# Patient Record
Sex: FEMALE | Race: BLACK OR AFRICAN AMERICAN | NOT HISPANIC OR LATINO | Employment: OTHER | URBAN - METROPOLITAN AREA
[De-identification: names, ages, dates, MRNs, and addresses within clinical notes are randomized per-mention and may not be internally consistent; named-entity substitution may affect disease eponyms.]

---

## 2017-03-15 ENCOUNTER — HISTORICAL (OUTPATIENT)
Dept: CARDIOLOGY | Facility: HOSPITAL | Age: 52
End: 2017-03-15

## 2017-03-20 ENCOUNTER — HISTORICAL (OUTPATIENT)
Dept: LAB | Facility: HOSPITAL | Age: 52
End: 2017-03-20

## 2017-06-12 ENCOUNTER — HISTORICAL (OUTPATIENT)
Dept: CARDIOLOGY | Facility: HOSPITAL | Age: 52
End: 2017-06-12

## 2017-06-12 LAB
ABS NEUT (OLG): 4.12 X10(3)/MCL (ref 2.1–9.2)
APTT PPP: 28.5 SECOND(S) (ref 20.6–36)
BASOPHILS # BLD AUTO: 0 X10(3)/MCL (ref 0–0.2)
BASOPHILS NFR BLD AUTO: 1 %
BUN SERPL-MCNC: 19 MG/DL (ref 7–18)
CALCIUM SERPL-MCNC: 9.3 MG/DL (ref 8.5–10.1)
CHLORIDE SERPL-SCNC: 107 MMOL/L (ref 98–107)
CO2 SERPL-SCNC: 31 MMOL/L (ref 21–32)
CREAT SERPL-MCNC: 0.82 MG/DL (ref 0.55–1.02)
EOSINOPHIL # BLD AUTO: 0 X10(3)/MCL (ref 0–0.9)
EOSINOPHIL NFR BLD AUTO: 1 %
ERYTHROCYTE [DISTWIDTH] IN BLOOD BY AUTOMATED COUNT: 14.6 % (ref 11.5–17)
GLUCOSE SERPL-MCNC: 95 MG/DL (ref 74–106)
HCT VFR BLD AUTO: 38.6 % (ref 37–47)
HGB BLD-MCNC: 12.3 GM/DL (ref 12–16)
INR PPP: 0.98 (ref 0–1.27)
LYMPHOCYTES # BLD AUTO: 1.6 X10(3)/MCL (ref 0.6–4.6)
LYMPHOCYTES NFR BLD AUTO: 25 %
MCH RBC QN AUTO: 28.2 PG (ref 27–31)
MCHC RBC AUTO-ENTMCNC: 31.9 GM/DL (ref 33–36)
MCV RBC AUTO: 88.5 FL (ref 80–94)
MONOCYTES # BLD AUTO: 0.5 X10(3)/MCL (ref 0.1–1.3)
MONOCYTES NFR BLD AUTO: 8 %
NEUTROPHILS # BLD AUTO: 4.12 X10(3)/MCL (ref 1.4–7.9)
NEUTROPHILS NFR BLD AUTO: 65 %
PLATELET # BLD AUTO: 320 X10(3)/MCL (ref 130–400)
PMV BLD AUTO: 11 FL (ref 9.4–12.4)
POTASSIUM SERPL-SCNC: 3.3 MMOL/L (ref 3.5–5.1)
PROTHROMBIN TIME: 12.8 SECOND(S) (ref 12.1–14.2)
RBC # BLD AUTO: 4.36 X10(6)/MCL (ref 4.2–5.4)
SODIUM SERPL-SCNC: 146 MMOL/L (ref 136–145)
WBC # SPEC AUTO: 6.3 X10(3)/MCL (ref 4.5–11.5)

## 2018-10-09 ENCOUNTER — HISTORICAL (OUTPATIENT)
Dept: CARDIOLOGY | Facility: HOSPITAL | Age: 53
End: 2018-10-09

## 2018-10-09 LAB
BUN SERPL-MCNC: 40 MG/DL (ref 7–18)
CALCIUM SERPL-MCNC: 10.1 MG/DL (ref 8.5–10.1)
CHLORIDE SERPL-SCNC: 103 MMOL/L (ref 98–107)
CO2 SERPL-SCNC: 28 MMOL/L (ref 21–32)
CREAT SERPL-MCNC: 1.4 MG/DL (ref 0.55–1.02)
CREAT/UREA NIT SERPL: 28.6
GLUCOSE SERPL-MCNC: 98 MG/DL (ref 74–106)
POTASSIUM SERPL-SCNC: 4 MMOL/L (ref 3.5–5.1)
SODIUM SERPL-SCNC: 140 MMOL/L (ref 136–145)

## 2019-08-23 PROBLEM — M16.12 PRIMARY OSTEOARTHRITIS OF LEFT HIP: Status: ACTIVE | Noted: 2019-08-23

## 2019-11-27 PROBLEM — Z01.818 ENCOUNTER FOR OTHER PREPROCEDURAL EXAMINATION: Status: ACTIVE | Noted: 2019-11-01

## 2019-11-27 PROBLEM — G89.29 CHRONIC PAIN OF BOTH KNEES: Status: ACTIVE | Noted: 2017-02-06

## 2019-11-27 PROBLEM — M25.552 LEFT HIP PAIN: Status: ACTIVE | Noted: 2018-05-04

## 2019-11-27 PROBLEM — M25.561 CHRONIC PAIN OF BOTH KNEES: Status: ACTIVE | Noted: 2017-02-06

## 2019-11-27 PROBLEM — M25.562 CHRONIC PAIN OF BOTH KNEES: Status: ACTIVE | Noted: 2017-02-06

## 2020-05-05 ENCOUNTER — OFFICE VISIT (OUTPATIENT)
Dept: SURGERY | Facility: CLINIC | Age: 55
End: 2020-05-05
Payer: MEDICAID

## 2020-05-05 VITALS
WEIGHT: 210 LBS | OXYGEN SATURATION: 98 % | SYSTOLIC BLOOD PRESSURE: 147 MMHG | HEART RATE: 64 BPM | RESPIRATION RATE: 18 BRPM | BODY MASS INDEX: 31.93 KG/M2 | DIASTOLIC BLOOD PRESSURE: 88 MMHG

## 2020-05-05 DIAGNOSIS — K92.1 HEMATOCHEZIA: Primary | ICD-10-CM

## 2020-05-05 PROCEDURE — 99203 PR OFFICE/OUTPT VISIT, NEW, LEVL III, 30-44 MIN: ICD-10-PCS | Mod: S$GLB,,, | Performed by: SURGERY

## 2020-05-05 PROCEDURE — 99203 OFFICE O/P NEW LOW 30 MIN: CPT | Mod: S$GLB,,, | Performed by: SURGERY

## 2020-05-05 NOTE — LETTER
May 5, 2020      [facility]  [address]  [address]  [place] [address]           [facility]  [address] [address]  Phone: [phone]  Fax: [phone]          Patient: Marci Craft   MR Number: [id number]   YOB: 1965   Date of Visit: 5/5/2020       Dear [facility]:    Thank you for referring Marci Craft to me for evaluation. Attached you will find relevant portions of my assessment and plan of care.    If you have questions, please do not hesitate to call me. I look forward to following Marci Craft along with you.    Sincerely,    Basilio Valle, DO    Enclosure  CC:  No Recipients    If you would like to receive this communication electronically, please contact [email or web address] or [phone] to request more information on [employer] Link access.    For providers and/or their staff who would like to refer a patient to [facility], please contact us through our one-stop-shop provider referral line, [facility], at [phone].    If you feel you have received this communication in error or would no longer like to receive these types of communications, please e-mail [email or web address]

## 2020-05-05 NOTE — PROGRESS NOTES
Subjective:       Patient ID: Marci Craft is a 54 y.o. female.    Chief Complaint: No chief complaint on file.    51-year-old female complaining of chronic diarrhea, and bright red blood per rectum.  Patient states that she has had GI bleed in the past she needed to be transfused.  This started over the last several weeks and is having blood per rectum.  Not complaining of any abdominal pain or any pain in her perianal area.  Patient had a colonoscopy several years ago but the findings are unclear.      Review of Systems   Constitutional: Negative for chills and fever.   HENT: Negative for congestion and rhinorrhea.    Eyes: Negative for discharge and visual disturbance.   Respiratory: Negative for shortness of breath and wheezing.    Cardiovascular: Negative for chest pain and palpitations.   Gastrointestinal: Negative.  Negative for abdominal distention, abdominal pain, anal bleeding, blood in stool, constipation, diarrhea, nausea, rectal pain and vomiting.   Endocrine: Negative for cold intolerance and heat intolerance.   Genitourinary: Negative for difficulty urinating and frequency.   Musculoskeletal: Negative for back pain and myalgias.   Skin: Negative for pallor and rash.   Neurological: Negative for dizziness and headaches.   Hematological: Negative for adenopathy. Does not bruise/bleed easily.   Psychiatric/Behavioral: Negative for behavioral problems. The patient is not nervous/anxious.        Objective:      Physical Exam   Constitutional: She is oriented to person, place, and time. Vital signs are normal. She appears well-developed and well-nourished. She is cooperative.   HENT:   Head: Normocephalic and atraumatic.   Eyes: Conjunctivae, EOM and lids are normal.   Neck: Trachea normal and normal range of motion.   Cardiovascular: Normal rate, regular rhythm and normal heart sounds.   Pulmonary/Chest: Effort normal and breath sounds normal.   Abdominal: Soft. Normal appearance and bowel sounds are  normal. She exhibits no distension. There is no tenderness. No hernia.   Neurological: She is alert and oriented to person, place, and time. She has normal strength.   Skin: Skin is warm, dry and intact.   Psychiatric: She has a normal mood and affect. Her speech is normal and behavior is normal.       Assessment:       No diagnosis found.    Plan:       54-year-old female with hematochezia, chronic diarrhea.  Will schedule patient for diagnostic colonoscopy, risks benefits discussed patient detail.

## 2020-05-29 PROBLEM — G89.29 CHRONIC RIGHT HIP PAIN: Status: ACTIVE | Noted: 2020-05-29

## 2020-05-29 PROBLEM — M25.551 CHRONIC RIGHT HIP PAIN: Status: ACTIVE | Noted: 2020-05-29

## 2020-05-29 PROBLEM — M16.11 OSTEOARTHRITIS OF RIGHT HIP: Status: ACTIVE | Noted: 2020-05-29

## 2020-08-03 LAB
CALCIUM BLD-MCNC: NEGATIVE MG/DL
COV PREGNANCY STATUS: NORMAL
COVID-19 AB, IGM: NEGATIVE
PATIENT ADMITTED TO ICU FOR CONDITION: NO
PATIENT EMPLOYED IN HEALTHCARE: NO
PATIENT HAS SYMPTOMS FOR CONDITION OF INTEREST: NO
PATIENT HOSPITALIZED BC COND: NO
PATIENT IN CONGREGATE CARE: NO

## 2020-08-10 ENCOUNTER — OUTSIDE PLACE OF SERVICE (OUTPATIENT)
Dept: SURGERY | Facility: CLINIC | Age: 55
End: 2020-08-10
Payer: MEDICAID

## 2020-08-10 PROCEDURE — 45378 DIAGNOSTIC COLONOSCOPY: CPT | Mod: ,,, | Performed by: SURGERY

## 2020-08-10 PROCEDURE — 45378 PR COLONOSCOPY,DIAGNOSTIC: ICD-10-PCS | Mod: ,,, | Performed by: SURGERY

## 2020-08-19 ENCOUNTER — TELEPHONE (OUTPATIENT)
Dept: SURGERY | Facility: CLINIC | Age: 55
End: 2020-08-19

## 2020-09-14 PROBLEM — Z01.818 PRE-OPERATIVE EXAMINATION: Status: ACTIVE | Noted: 2020-09-14

## 2020-09-17 PROBLEM — M16.11 OSTEOARTHRITIS OF RIGHT HIP: Status: RESOLVED | Noted: 2020-05-29 | Resolved: 2020-09-17

## 2020-09-17 PROBLEM — M16.12 PRIMARY OSTEOARTHRITIS OF LEFT HIP: Status: RESOLVED | Noted: 2019-08-23 | Resolved: 2020-09-17

## 2020-09-17 PROBLEM — M16.11 PRIMARY OSTEOARTHRITIS OF RIGHT HIP: Status: ACTIVE | Noted: 2020-09-17

## 2020-09-17 PROBLEM — Z96.641 STATUS POST TOTAL REPLACEMENT OF RIGHT HIP: Status: ACTIVE | Noted: 2020-09-17

## 2020-09-17 PROBLEM — Z96.642 STATUS POST TOTAL HIP REPLACEMENT, LEFT: Status: ACTIVE | Noted: 2020-09-17

## 2020-09-17 PROBLEM — M25.552 LEFT HIP PAIN: Status: RESOLVED | Noted: 2018-05-04 | Resolved: 2020-09-17

## 2020-09-17 PROBLEM — M16.11 PRIMARY OSTEOARTHRITIS OF RIGHT HIP: Status: RESOLVED | Noted: 2020-09-17 | Resolved: 2020-09-17

## 2020-09-17 PROBLEM — G89.29 CHRONIC RIGHT HIP PAIN: Status: RESOLVED | Noted: 2020-05-29 | Resolved: 2020-09-17

## 2020-09-17 PROBLEM — M25.551 CHRONIC RIGHT HIP PAIN: Status: RESOLVED | Noted: 2020-05-29 | Resolved: 2020-09-17

## 2020-09-18 PROBLEM — M16.11 PRIMARY OSTEOARTHRITIS OF RIGHT HIP: Status: ACTIVE | Noted: 2020-09-18

## 2020-10-26 PROBLEM — G89.18 ACUTE POSTOPERATIVE PAIN OF RIGHT HIP: Status: ACTIVE | Noted: 2020-10-26

## 2020-10-26 PROBLEM — T81.49XA SUPERFICIAL POSTOPERATIVE WOUND INFECTION: Status: ACTIVE | Noted: 2020-10-26

## 2020-10-26 PROBLEM — L03.115 CELLULITIS OF RIGHT HIP: Status: ACTIVE | Noted: 2020-10-26

## 2020-10-26 PROBLEM — M25.551 ACUTE POSTOPERATIVE PAIN OF RIGHT HIP: Status: ACTIVE | Noted: 2020-10-26

## 2020-10-26 PROBLEM — Z96.641 HISTORY OF TOTAL RIGHT HIP REPLACEMENT: Status: ACTIVE | Noted: 2020-10-26

## 2020-10-27 PROBLEM — T81.42XA DEEP POSTOPERATIVE WOUND INFECTION: Status: ACTIVE | Noted: 2020-10-26

## 2020-10-27 PROBLEM — L03.115 CELLULITIS OF RIGHT HIP: Status: RESOLVED | Noted: 2020-10-26 | Resolved: 2020-10-27

## 2020-10-27 PROBLEM — Z01.818 PRE-OPERATIVE EXAMINATION: Status: RESOLVED | Noted: 2020-09-14 | Resolved: 2020-10-27

## 2020-10-27 PROBLEM — M25.551 ACUTE POSTOPERATIVE PAIN OF RIGHT HIP: Status: RESOLVED | Noted: 2020-10-26 | Resolved: 2020-10-27

## 2020-10-27 PROBLEM — G89.18 ACUTE POSTOPERATIVE PAIN OF RIGHT HIP: Status: RESOLVED | Noted: 2020-10-26 | Resolved: 2020-10-27

## 2021-05-12 ENCOUNTER — PATIENT MESSAGE (OUTPATIENT)
Dept: RESEARCH | Facility: HOSPITAL | Age: 56
End: 2021-05-12

## 2022-04-11 ENCOUNTER — HISTORICAL (OUTPATIENT)
Dept: ADMINISTRATIVE | Facility: HOSPITAL | Age: 57
End: 2022-04-11

## 2022-04-27 VITALS — BODY MASS INDEX: 25.94 KG/M2 | WEIGHT: 161.38 LBS | HEIGHT: 66 IN

## 2023-06-21 DIAGNOSIS — D3A.8 PRIMARY PANCREATIC NEUROENDOCRINE TUMOR: Primary | ICD-10-CM

## 2023-06-28 ENCOUNTER — OFFICE VISIT (OUTPATIENT)
Dept: SURGICAL ONCOLOGY | Facility: CLINIC | Age: 58
End: 2023-06-28
Payer: MEDICARE

## 2023-06-28 VITALS
DIASTOLIC BLOOD PRESSURE: 78 MMHG | SYSTOLIC BLOOD PRESSURE: 123 MMHG | BODY MASS INDEX: 31.67 KG/M2 | WEIGHT: 209 LBS | HEART RATE: 76 BPM | HEIGHT: 68 IN

## 2023-06-28 DIAGNOSIS — D3A.8 PRIMARY PANCREATIC NEUROENDOCRINE TUMOR: ICD-10-CM

## 2023-06-28 PROCEDURE — 3074F PR MOST RECENT SYSTOLIC BLOOD PRESSURE < 130 MM HG: ICD-10-PCS | Mod: CPTII,S$GLB,, | Performed by: SURGERY

## 2023-06-28 PROCEDURE — 1159F MED LIST DOCD IN RCRD: CPT | Mod: CPTII,S$GLB,, | Performed by: SURGERY

## 2023-06-28 PROCEDURE — 99999 PR PBB SHADOW E&M-EST. PATIENT-LVL V: CPT | Mod: PBBFAC,,, | Performed by: SURGERY

## 2023-06-28 PROCEDURE — 1159F PR MEDICATION LIST DOCUMENTED IN MEDICAL RECORD: ICD-10-PCS | Mod: CPTII,S$GLB,, | Performed by: SURGERY

## 2023-06-28 PROCEDURE — 3008F PR BODY MASS INDEX (BMI) DOCUMENTED: ICD-10-PCS | Mod: CPTII,S$GLB,, | Performed by: SURGERY

## 2023-06-28 PROCEDURE — 3008F BODY MASS INDEX DOCD: CPT | Mod: CPTII,S$GLB,, | Performed by: SURGERY

## 2023-06-28 PROCEDURE — 3078F DIAST BP <80 MM HG: CPT | Mod: CPTII,S$GLB,, | Performed by: SURGERY

## 2023-06-28 PROCEDURE — 3074F SYST BP LT 130 MM HG: CPT | Mod: CPTII,S$GLB,, | Performed by: SURGERY

## 2023-06-28 PROCEDURE — 99205 PR OFFICE/OUTPT VISIT, NEW, LEVL V, 60-74 MIN: ICD-10-PCS | Mod: S$GLB,,, | Performed by: SURGERY

## 2023-06-28 PROCEDURE — 99999 PR PBB SHADOW E&M-EST. PATIENT-LVL V: ICD-10-PCS | Mod: PBBFAC,,, | Performed by: SURGERY

## 2023-06-28 PROCEDURE — 99205 OFFICE O/P NEW HI 60 MIN: CPT | Mod: S$GLB,,, | Performed by: SURGERY

## 2023-06-28 PROCEDURE — 3078F PR MOST RECENT DIASTOLIC BLOOD PRESSURE < 80 MM HG: ICD-10-PCS | Mod: CPTII,S$GLB,, | Performed by: SURGERY

## 2023-06-28 RX ORDER — DEXTROMETHORPHAN HYDROBROMIDE, GUAIFENESIN 5; 100 MG/5ML; MG/5ML
650 LIQUID ORAL
COMMUNITY

## 2023-06-28 RX ORDER — PANTOPRAZOLE SODIUM 40 MG/1
40 TABLET, DELAYED RELEASE ORAL DAILY
COMMUNITY

## 2023-06-28 RX ORDER — PANCRELIPASE 24000; 76000; 120000 [USP'U]/1; [USP'U]/1; [USP'U]/1
2 CAPSULE, DELAYED RELEASE PELLETS ORAL
COMMUNITY

## 2023-06-28 RX ORDER — SERTRALINE HYDROCHLORIDE 100 MG/1
100 TABLET, FILM COATED ORAL 2 TIMES DAILY
COMMUNITY

## 2023-06-28 RX ORDER — CALCIUM CARB, CITRATE, MALATE 250 MG
1 CAPSULE ORAL DAILY
COMMUNITY

## 2023-06-28 RX ORDER — LOSARTAN POTASSIUM AND HYDROCHLOROTHIAZIDE 12.5; 1 MG/1; MG/1
1 TABLET ORAL DAILY
COMMUNITY

## 2023-06-28 NOTE — PROGRESS NOTES
History & Physical    CHIEF COMPLAINT:  PANCREATIC MASS     History of Present Illness:  57 year-old-female referred by Dr. Zachary Diaz.  In December 2022 patient presented to the ER with complaints of abdominal pain.  CT abd/pel showed a mass involving the distal body and tail of the pancreas with soft tissue stranding extending into the peripancreatic tissue which may represent superimposed pancreatitis.  EUS was performed, a parenchymal mass was noted in the pancreas; biopsy was not performed.  MRI abdomen in April 2023 showed a 6.7 x 3.8 x 3.8 cm heterogeneously enhancing mass within the pancreas, slightly decreased from previous imaging.  EUS performed in June 2023, parenchymal mass again noted in the pancreas with ductal dilation in the pancreas.  Biopsy of the mass revealed well-differentiated neuroendocrine tumor, grade 2 of 3.  The patient denies abdominal pain, nausea, vomiting, anorexia, weight loss, diarrhea or flushing.    Greater than 60 minutes was required for complete chart review, imaging review including interpretation of imaging, coordination with referring/other physicians, patient counseling regarding diagnosis/treatment plan, answering questions, medical decision making, and documentation.      Review of patient's allergies indicates:   Allergen Reactions    Nsaids (non-steroidal anti-inflammatory drug)        Current Outpatient Medications   Medication Sig Dispense Refill    amLODIPine (NORVASC) 5 MG tablet       aspirin (BUFFERIN) 325 MG Tab Take 1 tablet (325 mg total) by mouth 2 (two) times daily. 84 tablet 0    baclofen (LIORESAL) 10 MG tablet Take 10 mg by mouth 3 (three) times daily.      carvedilol (COREG) 12.5 MG tablet Take 12.5 mg by mouth.      cefTRIAXone (ROCEPHIN) 10 gram injection       clindamycin (CLEOCIN) 150 MG capsule       cyclobenzaprine (FLEXERIL) 10 MG tablet Take 10 mg by mouth 3 (three) times daily as needed for Muscle spasms.      diphenhydrAMINE (BENADRYL) 25 mg  capsule Take 25 mg by mouth every 6 (six) hours as needed for Itching.      ergocalciferol (ERGOCALCIFEROL) 50,000 unit Cap Take 50,000 Units by mouth.      escitalopram oxalate (LEXAPRO) 20 MG tablet Take 20 mg by mouth once daily.      furosemide (LASIX) 20 MG tablet       furosemide (LASIX) 40 MG tablet Take 40 mg by mouth 2 (two) times daily.      HYDROcodone-acetaminophen (NORCO) 5-325 mg per tablet SMARTSI Tablet(s) By Mouth Every 4 Hours      metoprolol tartrate (LOPRESSOR) 100 MG tablet Take 100 mg by mouth.      miscellaneous medical supply (C-TUB) Misc PT eval and treat DJD Knees      miscellaneous medical supply (C-TUB) Misc Dispense rolling walker, shower chair, bedside commode      miscellaneous medical supply (C-TUB) Misc PT eval and treat.  Post op L HUMBERTO protocol.   WBAT limit on post op limb.      omeprazole (PRILOSEC) 40 MG capsule       ondansetron (ZOFRAN-ODT) 4 MG TbDL       predniSONE (DELTASONE) 20 MG tablet       sacubitril-valsartan (ENTRESTO) 24-26 mg per tablet Take 1 tablet by mouth.      traMADoL (ULTRAM) 50 mg tablet       traZODone (DESYREL) 50 MG tablet Take 50 mg by mouth every evening.      vitamin D (VITAMIN D3) 1000 units Tab Take 1,000 Units by mouth once daily.       Current Facility-Administered Medications   Medication Dose Route Frequency Provider Last Rate Last Admin    sodium chloride 0.9% flush 10 mL  10 mL Intravenous PRN Jaime Augustine MD           Past Medical History:   Diagnosis Date    Anxiety     CHF (congestive heart failure)     Gout     Hypertension     Pacemaker 10/15/2018    Paroxysmal A-fib     Peptic ulcer disease      Past Surgical History:   Procedure Laterality Date    CARDIAC SURGERY      CARDIAC VALVE REPLACEMENT       SECTION      CHOLECYSTECTOMY      HIP ARTHROPLASTY Right 2020    Procedure: ARTHROPLASTY, HIP;  Surgeon: Minor Robledo MD;  Location: [facility] MAIN OR;  Service: Orthopedics;  Laterality: Right;    HIP SURGERY      INJECTION  OF JOINT Right 03/12/2020    Procedure: Injection, Joint, Shoulder, Hip, Or Knee;  Surgeon: Minor Robledo MD;  Location: [facility] MAIN OR;  Service: Orthopedics;  Laterality: Right;    IRRIGATION AND DEBRIDEMENT OF LOWER EXTREMITY Right 10/27/2020    Procedure: IRRIGATION AND DEBRIDEMENT RIGHT HIP;  Surgeon: Minor Robledo MD;  Location: [facility] MAIN OR;  Service: Orthopedics;  Laterality: Right;    MITRAL VALVE REPLACEMENT  03/21/2017    TUBAL LIGATION       Family History   Problem Relation Age of Onset    Depression Mother     Hyperlipidemia Maternal Aunt     Hypertension Maternal Aunt     Breast cancer Maternal Aunt      Social History     Tobacco Use    Smoking status: Never    Smokeless tobacco: Never   Substance Use Topics    Alcohol use: Never    Drug use: Never        Review of Systems:  Review of Systems   Constitutional:  Negative for appetite change, chills, diaphoresis and fever.   HENT:  Negative for congestion, drooling, ear discharge, ear pain and hearing loss.    Eyes:  Negative for discharge.   Respiratory:  Negative for apnea, cough, choking, chest tightness, shortness of breath and stridor.    Cardiovascular:  Negative for chest pain, palpitations and leg swelling.   Endocrine: Negative for cold intolerance and heat intolerance.   Genitourinary:  Negative for difficulty urinating, dyspareunia, dysuria and hematuria.   Musculoskeletal:  Negative for arthralgias, gait problem and joint swelling.   Skin:  Negative for color change and rash.   Neurological:  Negative for dizziness, tremors, seizures, syncope, facial asymmetry, speech difficulty, light-headedness, numbness and headaches.   Psychiatric/Behavioral:  Negative for agitation and confusion.      OBJECTIVE:     Vital Signs (Most Recent)              Physical Exam:  Physical Exam  Constitutional:       General: She is not in acute distress.     Appearance: Normal appearance. She is not toxic-appearing.   HENT:      Head: Normocephalic and  atraumatic.      Right Ear: External ear normal.      Left Ear: External ear normal.      Mouth/Throat:      Mouth: Mucous membranes are moist.   Eyes:      General: No scleral icterus.     Conjunctiva/sclera: Conjunctivae normal.      Pupils: Pupils are equal, round, and reactive to light.   Cardiovascular:      Rate and Rhythm: Normal rate and regular rhythm.      Pulses: Normal pulses.   Pulmonary:      Effort: Pulmonary effort is normal. No respiratory distress.      Breath sounds: Normal breath sounds. No stridor. No wheezing or rhonchi.   Musculoskeletal:         General: No swelling or tenderness. Normal range of motion.      Cervical back: Normal range of motion and neck supple.      Right lower leg: No edema.      Left lower leg: No edema.   Skin:     General: Skin is warm.      Capillary Refill: Capillary refill takes less than 2 seconds.      Coloration: Skin is not jaundiced or pale.      Findings: No erythema or rash.   Neurological:      General: No focal deficit present.      Mental Status: She is alert and oriented to person, place, and time.      Gait: Gait normal.   Psychiatric:         Mood and Affect: Mood normal.         Behavior: Behavior normal.         Judgment: Judgment normal.         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  No evidence of metastatic disease    Diagnosis, staging, prognosis and treatment guidelines for pancreatic neuroendocrine cancer were discussed with the patient in detail, all questions were addressed.  Using visual aids and drawings, I described the anatomy and potential surgical procedures.    Preoperative vaccinations    Schedule laparoscopic/robotic distal pancreatectomy/splenectomy with intraoperative ultrasound    The risks and benefits of the procedure were explained in detail using layman terms, including the pros and cons of any implant that may be used, all questions were addressed, the  patient gives consent to proceed    Ab Das MD  Surgical Oncology  Complex General, Gastrointestinal and Hepatobiliary Surgery

## 2023-06-28 NOTE — H&P (VIEW-ONLY)
History & Physical    CHIEF COMPLAINT:  PANCREATIC MASS     History of Present Illness:  57 year-old-female referred by Dr. Zachary Diaz.  In December 2022 patient presented to the ER with complaints of abdominal pain.  CT abd/pel showed a mass involving the distal body and tail of the pancreas with soft tissue stranding extending into the peripancreatic tissue which may represent superimposed pancreatitis.  EUS was performed, a parenchymal mass was noted in the pancreas; biopsy was not performed.  MRI abdomen in April 2023 showed a 6.7 x 3.8 x 3.8 cm heterogeneously enhancing mass within the pancreas, slightly decreased from previous imaging.  EUS performed in June 2023, parenchymal mass again noted in the pancreas with ductal dilation in the pancreas.  Biopsy of the mass revealed well-differentiated neuroendocrine tumor, grade 2 of 3.  The patient denies abdominal pain, nausea, vomiting, anorexia, weight loss, diarrhea or flushing.    Greater than 60 minutes was required for complete chart review, imaging review including interpretation of imaging, coordination with referring/other physicians, patient counseling regarding diagnosis/treatment plan, answering questions, medical decision making, and documentation.      Review of patient's allergies indicates:   Allergen Reactions    Nsaids (non-steroidal anti-inflammatory drug)        Current Outpatient Medications   Medication Sig Dispense Refill    amLODIPine (NORVASC) 5 MG tablet       aspirin (BUFFERIN) 325 MG Tab Take 1 tablet (325 mg total) by mouth 2 (two) times daily. 84 tablet 0    baclofen (LIORESAL) 10 MG tablet Take 10 mg by mouth 3 (three) times daily.      carvedilol (COREG) 12.5 MG tablet Take 12.5 mg by mouth.      cefTRIAXone (ROCEPHIN) 10 gram injection       clindamycin (CLEOCIN) 150 MG capsule       cyclobenzaprine (FLEXERIL) 10 MG tablet Take 10 mg by mouth 3 (three) times daily as needed for Muscle spasms.      diphenhydrAMINE (BENADRYL) 25 mg  capsule Take 25 mg by mouth every 6 (six) hours as needed for Itching.      ergocalciferol (ERGOCALCIFEROL) 50,000 unit Cap Take 50,000 Units by mouth.      escitalopram oxalate (LEXAPRO) 20 MG tablet Take 20 mg by mouth once daily.      furosemide (LASIX) 20 MG tablet       furosemide (LASIX) 40 MG tablet Take 40 mg by mouth 2 (two) times daily.      HYDROcodone-acetaminophen (NORCO) 5-325 mg per tablet SMARTSI Tablet(s) By Mouth Every 4 Hours      metoprolol tartrate (LOPRESSOR) 100 MG tablet Take 100 mg by mouth.      miscellaneous medical supply (C-TUB) Misc PT eval and treat DJD Knees      miscellaneous medical supply (C-TUB) Misc Dispense rolling walker, shower chair, bedside commode      miscellaneous medical supply (C-TUB) Misc PT eval and treat.  Post op L HUMBERTO protocol.   WBAT limit on post op limb.      omeprazole (PRILOSEC) 40 MG capsule       ondansetron (ZOFRAN-ODT) 4 MG TbDL       predniSONE (DELTASONE) 20 MG tablet       sacubitril-valsartan (ENTRESTO) 24-26 mg per tablet Take 1 tablet by mouth.      traMADoL (ULTRAM) 50 mg tablet       traZODone (DESYREL) 50 MG tablet Take 50 mg by mouth every evening.      vitamin D (VITAMIN D3) 1000 units Tab Take 1,000 Units by mouth once daily.       Current Facility-Administered Medications   Medication Dose Route Frequency Provider Last Rate Last Admin    sodium chloride 0.9% flush 10 mL  10 mL Intravenous PRN Jaime Augustine MD           Past Medical History:   Diagnosis Date    Anxiety     CHF (congestive heart failure)     Gout     Hypertension     Pacemaker 10/15/2018    Paroxysmal A-fib     Peptic ulcer disease      Past Surgical History:   Procedure Laterality Date    CARDIAC SURGERY      CARDIAC VALVE REPLACEMENT       SECTION      CHOLECYSTECTOMY      HIP ARTHROPLASTY Right 2020    Procedure: ARTHROPLASTY, HIP;  Surgeon: Minor Robledo MD;  Location: [facility] MAIN OR;  Service: Orthopedics;  Laterality: Right;    HIP SURGERY      INJECTION  OF JOINT Right 03/12/2020    Procedure: Injection, Joint, Shoulder, Hip, Or Knee;  Surgeon: Minor Robledo MD;  Location: [facility] MAIN OR;  Service: Orthopedics;  Laterality: Right;    IRRIGATION AND DEBRIDEMENT OF LOWER EXTREMITY Right 10/27/2020    Procedure: IRRIGATION AND DEBRIDEMENT RIGHT HIP;  Surgeon: Minor Robledo MD;  Location: [facility] MAIN OR;  Service: Orthopedics;  Laterality: Right;    MITRAL VALVE REPLACEMENT  03/21/2017    TUBAL LIGATION       Family History   Problem Relation Age of Onset    Depression Mother     Hyperlipidemia Maternal Aunt     Hypertension Maternal Aunt     Breast cancer Maternal Aunt      Social History     Tobacco Use    Smoking status: Never    Smokeless tobacco: Never   Substance Use Topics    Alcohol use: Never    Drug use: Never        Review of Systems:  Review of Systems   Constitutional:  Negative for appetite change, chills, diaphoresis and fever.   HENT:  Negative for congestion, drooling, ear discharge, ear pain and hearing loss.    Eyes:  Negative for discharge.   Respiratory:  Negative for apnea, cough, choking, chest tightness, shortness of breath and stridor.    Cardiovascular:  Negative for chest pain, palpitations and leg swelling.   Endocrine: Negative for cold intolerance and heat intolerance.   Genitourinary:  Negative for difficulty urinating, dyspareunia, dysuria and hematuria.   Musculoskeletal:  Negative for arthralgias, gait problem and joint swelling.   Skin:  Negative for color change and rash.   Neurological:  Negative for dizziness, tremors, seizures, syncope, facial asymmetry, speech difficulty, light-headedness, numbness and headaches.   Psychiatric/Behavioral:  Negative for agitation and confusion.      OBJECTIVE:     Vital Signs (Most Recent)              Physical Exam:  Physical Exam  Constitutional:       General: She is not in acute distress.     Appearance: Normal appearance. She is not toxic-appearing.   HENT:      Head: Normocephalic and  atraumatic.      Right Ear: External ear normal.      Left Ear: External ear normal.      Mouth/Throat:      Mouth: Mucous membranes are moist.   Eyes:      General: No scleral icterus.     Conjunctiva/sclera: Conjunctivae normal.      Pupils: Pupils are equal, round, and reactive to light.   Cardiovascular:      Rate and Rhythm: Normal rate and regular rhythm.      Pulses: Normal pulses.   Pulmonary:      Effort: Pulmonary effort is normal. No respiratory distress.      Breath sounds: Normal breath sounds. No stridor. No wheezing or rhonchi.   Musculoskeletal:         General: No swelling or tenderness. Normal range of motion.      Cervical back: Normal range of motion and neck supple.      Right lower leg: No edema.      Left lower leg: No edema.   Skin:     General: Skin is warm.      Capillary Refill: Capillary refill takes less than 2 seconds.      Coloration: Skin is not jaundiced or pale.      Findings: No erythema or rash.   Neurological:      General: No focal deficit present.      Mental Status: She is alert and oriented to person, place, and time.      Gait: Gait normal.   Psychiatric:         Mood and Affect: Mood normal.         Behavior: Behavior normal.         Judgment: Judgment normal.         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  No evidence of metastatic disease    Diagnosis, staging, prognosis and treatment guidelines for pancreatic neuroendocrine cancer were discussed with the patient in detail, all questions were addressed.  Using visual aids and drawings, I described the anatomy and potential surgical procedures.    Preoperative vaccinations    Schedule laparoscopic/robotic distal pancreatectomy/splenectomy with intraoperative ultrasound    The risks and benefits of the procedure were explained in detail using layman terms, including the pros and cons of any implant that may be used, all questions were addressed, the  patient gives consent to proceed    Ab Das MD  Surgical Oncology  Complex General, Gastrointestinal and Hepatobiliary Surgery

## 2023-06-29 DIAGNOSIS — Z01.818 PREOP TESTING: ICD-10-CM

## 2023-06-29 DIAGNOSIS — D3A.8 PRIMARY PANCREATIC NEUROENDOCRINE TUMOR: Primary | ICD-10-CM

## 2023-06-29 DIAGNOSIS — D3A.8 NEUROENDOCRINE TUMOR: ICD-10-CM

## 2023-06-29 RX ORDER — ENOXAPARIN SODIUM 300 MG/3ML
30 INJECTION INTRAVENOUS; SUBCUTANEOUS EVERY 24 HOURS
Status: CANCELLED | OUTPATIENT
Start: 2023-06-29

## 2023-06-29 RX ORDER — ALVIMOPAN 12 MG/1
12 CAPSULE ORAL ONCE
Status: CANCELLED | OUTPATIENT
Start: 2023-07-24 | End: 2023-07-30

## 2023-06-29 RX ORDER — HYDROCODONE BITARTRATE AND ACETAMINOPHEN 500; 5 MG/1; MG/1
TABLET ORAL
Status: CANCELLED | OUTPATIENT
Start: 2023-07-24

## 2023-07-13 RX ORDER — MAGNESIUM 250 MG
1 TABLET ORAL DAILY
COMMUNITY

## 2023-07-13 RX ORDER — HYDROGEN PEROXIDE 3 %
20 SOLUTION, NON-ORAL MISCELLANEOUS
COMMUNITY

## 2023-07-21 ENCOUNTER — ANESTHESIA EVENT (OUTPATIENT)
Dept: SURGERY | Facility: HOSPITAL | Age: 58
DRG: 828 | End: 2023-07-21
Payer: MEDICARE

## 2023-07-26 NOTE — PRE-PROCEDURE INSTRUCTIONS
[facility]: Outpatient Surgery  Preprocedure Check-In Instructions     Your arrival time for your surgery or procedure is 0500.  We ask patients to arrive about 2 hours before surgery to allow for enough time to review your health history & medications, start your IV, complete any outstanding labwork or tests, and meet your Anesthesiologist.  You will arrive at [facility], [address] [place] [address].  Enter through the [place] entrance next to the Emergency Room, and come to the 6th floor to the Outpatient Surgery Department.     Visitory Policy:  You are allowed 2 adult visitors to be with you in the hospital.  Any additional visitors may switch places at the hospital entrance (not in the waiting rooms).  All hospital visitors should be in good current health.  No small children.     What to Bring:  Please have your ID, insurance cards, and all home medication bottles with you at check in.  Bring your CPAP machine if one is used at home.     Fasting:  Nothing to eat or drink after midnight the night before your procedure. This includes no ice, gum, hard candies, and/or tobacco products.  Follow your doctor's instructions for taking any medications on the morning of your procedure.  If no instructions for taking medications were given, do not take any medications but bring your medications in their bottles to your procedure check in.     Follow your doctor's preoperative instructions regarding skin prep, bowel prep, bathing, or showering prior to your procedure.  If any special soaps were provided to you, please use according to your doctor's instructions. If no instructions were given from your doctor, take a good bath or shower with antibacterial soap the night before and the morning of your procedure.  On the morning of procedure, wear loose, comfortable clothing.  No lotions, makeup, perfumes, colognes, deodorant, or jewelry to your procedure.  Removable items  (glasses, contact lenses, dentures, retainers, hearing aids) need to be removed for your procedure.  Bring your storage containers for these items if you wear them.     Artificial nails, body jewelry, eyelash extensions, and/or hair extensions with metal clips are not allowed during your surgery.  If you currently wear any of these items, please arrange for them to be removed prior to your arrival to the hospital.     Outpatient or Same Day Surgeries:  Any patients receiving sedation/anesthesia are advised not to drive for 24 hours after their procedure.  We do not allow patients to drive themselves home after discharge.  If you are going home after your procedure, please have someone available to drive you home from the hospital.        You may call the Outpatient Surgery Department at [phone] with any questions or concerns.  We are looking forward to meeting you and taking great care of you for your procedure.  Thank you for choosing [facility] for your surgical needs.

## 2023-07-27 ENCOUNTER — ANESTHESIA (OUTPATIENT)
Dept: SURGERY | Facility: HOSPITAL | Age: 58
DRG: 828 | End: 2023-07-27
Payer: MEDICARE

## 2023-07-27 ENCOUNTER — HOSPITAL ENCOUNTER (INPATIENT)
Facility: HOSPITAL | Age: 58
LOS: 2 days | Discharge: HOME | DRG: 828 | End: 2023-07-29
Attending: SURGERY | Admitting: SURGERY
Payer: MEDICARE

## 2023-07-27 DIAGNOSIS — D3A.8 NEUROENDOCRINE TUMOR: ICD-10-CM

## 2023-07-27 DIAGNOSIS — D3A.8 PRIMARY PANCREATIC NEUROENDOCRINE TUMOR: ICD-10-CM

## 2023-07-27 LAB
GROUP & RH: NORMAL
INDIRECT COOMBS GEL: NORMAL
POCT GLUCOSE: 108 MG/DL (ref 70–110)
SPECIMEN OUTDATE: NORMAL

## 2023-07-27 PROCEDURE — D9220A PRA ANESTHESIA: Mod: ANES,,, | Performed by: ANESTHESIOLOGY

## 2023-07-27 PROCEDURE — A4216 STERILE WATER/SALINE, 10 ML: HCPCS | Performed by: SURGERY

## 2023-07-27 PROCEDURE — 37000008 HC ANESTHESIA 1ST 15 MINUTES: Performed by: SURGERY

## 2023-07-27 PROCEDURE — 99900035 HC TECH TIME PER 15 MIN (STAT)

## 2023-07-27 PROCEDURE — 48999 PR LAP, DISTAL PANCREATECTOMY W/WOUT SPLENCTOMY: ICD-10-PCS | Mod: ,,, | Performed by: SURGERY

## 2023-07-27 PROCEDURE — 88341 IMHCHEM/IMCYTCHM EA ADD ANTB: CPT

## 2023-07-27 PROCEDURE — 38589 PR LAPAROSCOPIC ABDOMINAL LYMPHADENECTOMY: ICD-10-PCS | Mod: 51,,, | Performed by: SURGERY

## 2023-07-27 PROCEDURE — D9220A PRA ANESTHESIA: ICD-10-PCS | Mod: ANES,,, | Performed by: ANESTHESIOLOGY

## 2023-07-27 PROCEDURE — 48999 UNLISTED PROCEDURE PANCREAS: CPT | Mod: ,,, | Performed by: SURGERY

## 2023-07-27 PROCEDURE — 63600175 PHARM REV CODE 636 W HCPCS

## 2023-07-27 PROCEDURE — G0378 HOSPITAL OBSERVATION PER HR: HCPCS

## 2023-07-27 PROCEDURE — 36000713 HC OR TIME LEV V EA ADD 15 MIN: Performed by: SURGERY

## 2023-07-27 PROCEDURE — 71000033 HC RECOVERY, INTIAL HOUR: Performed by: SURGERY

## 2023-07-27 PROCEDURE — 88342 IMHCHEM/IMCYTCHM 1ST ANTB: CPT

## 2023-07-27 PROCEDURE — 27201423 OPTIME MED/SURG SUP & DEVICES STERILE SUPPLY: Performed by: SURGERY

## 2023-07-27 PROCEDURE — 63600175 PHARM REV CODE 636 W HCPCS: Performed by: ANESTHESIOLOGY

## 2023-07-27 PROCEDURE — 88309 TISSUE EXAM BY PATHOLOGIST: CPT | Performed by: SURGERY

## 2023-07-27 PROCEDURE — D9220A PRA ANESTHESIA: ICD-10-PCS | Mod: CRNA,,,

## 2023-07-27 PROCEDURE — 94761 N-INVAS EAR/PLS OXIMETRY MLT: CPT

## 2023-07-27 PROCEDURE — 36620 ARTERIAL: ICD-10-PCS | Mod: 59,,, | Performed by: ANESTHESIOLOGY

## 2023-07-27 PROCEDURE — 63600175 PHARM REV CODE 636 W HCPCS: Performed by: NURSE PRACTITIONER

## 2023-07-27 PROCEDURE — 71000015 HC POSTOP RECOV 1ST HR: Performed by: SURGERY

## 2023-07-27 PROCEDURE — 71000016 HC POSTOP RECOV ADDL HR: Performed by: SURGERY

## 2023-07-27 PROCEDURE — 25000003 PHARM REV CODE 250: Performed by: NURSE PRACTITIONER

## 2023-07-27 PROCEDURE — 36620 INSERTION CATHETER ARTERY: CPT | Performed by: ANESTHESIOLOGY

## 2023-07-27 PROCEDURE — 38589 UNLISTED LAPS PX LYMPHTC SYS: CPT | Mod: 51,,, | Performed by: SURGERY

## 2023-07-27 PROCEDURE — 11000001 HC ACUTE MED/SURG PRIVATE ROOM

## 2023-07-27 PROCEDURE — 37000009 HC ANESTHESIA EA ADD 15 MINS: Performed by: SURGERY

## 2023-07-27 PROCEDURE — 25000003 PHARM REV CODE 250: Performed by: SURGERY

## 2023-07-27 PROCEDURE — 36000712 HC OR TIME LEV V 1ST 15 MIN: Performed by: SURGERY

## 2023-07-27 PROCEDURE — C9290 INJ, BUPIVACAINE LIPOSOME: HCPCS | Performed by: SURGERY

## 2023-07-27 PROCEDURE — 71000039 HC RECOVERY, EACH ADD'L HOUR: Performed by: SURGERY

## 2023-07-27 PROCEDURE — 86900 BLOOD TYPING SEROLOGIC ABO: CPT | Performed by: SURGERY

## 2023-07-27 PROCEDURE — 88313 SPECIAL STAINS GROUP 2: CPT

## 2023-07-27 PROCEDURE — 63600175 PHARM REV CODE 636 W HCPCS: Performed by: SURGERY

## 2023-07-27 PROCEDURE — D9220A PRA ANESTHESIA: Mod: CRNA,,,

## 2023-07-27 PROCEDURE — C1729 CATH, DRAINAGE: HCPCS | Performed by: SURGERY

## 2023-07-27 PROCEDURE — 25000003 PHARM REV CODE 250

## 2023-07-27 RX ORDER — DEXAMETHASONE SODIUM PHOSPHATE 4 MG/ML
INJECTION, SOLUTION INTRA-ARTICULAR; INTRALESIONAL; INTRAMUSCULAR; INTRAVENOUS; SOFT TISSUE
Status: DISCONTINUED | OUTPATIENT
Start: 2023-07-27 | End: 2023-07-27

## 2023-07-27 RX ORDER — METOPROLOL TARTRATE 50 MG/1
100 TABLET ORAL 2 TIMES DAILY
Status: DISCONTINUED | OUTPATIENT
Start: 2023-07-27 | End: 2023-07-29 | Stop reason: HOSPADM

## 2023-07-27 RX ORDER — IBUPROFEN 200 MG
16 TABLET ORAL
Status: DISCONTINUED | OUTPATIENT
Start: 2023-07-27 | End: 2023-07-29 | Stop reason: HOSPADM

## 2023-07-27 RX ORDER — ENOXAPARIN SODIUM 100 MG/ML
30 INJECTION SUBCUTANEOUS EVERY 24 HOURS
Status: DISCONTINUED | OUTPATIENT
Start: 2023-07-27 | End: 2023-07-27

## 2023-07-27 RX ORDER — HYDROMORPHONE HYDROCHLORIDE 2 MG/ML
0.2 INJECTION, SOLUTION INTRAMUSCULAR; INTRAVENOUS; SUBCUTANEOUS EVERY 5 MIN PRN
Status: DISCONTINUED | OUTPATIENT
Start: 2023-07-27 | End: 2023-07-27 | Stop reason: HOSPADM

## 2023-07-27 RX ORDER — SODIUM CHLORIDE, SODIUM LACTATE, POTASSIUM CHLORIDE, CALCIUM CHLORIDE 600; 310; 30; 20 MG/100ML; MG/100ML; MG/100ML; MG/100ML
INJECTION, SOLUTION INTRAVENOUS CONTINUOUS
Status: DISCONTINUED | OUTPATIENT
Start: 2023-07-27 | End: 2023-07-29 | Stop reason: HOSPADM

## 2023-07-27 RX ORDER — ALVIMOPAN 12 MG/1
12 CAPSULE ORAL 2 TIMES DAILY
Status: DISCONTINUED | OUTPATIENT
Start: 2023-07-27 | End: 2023-07-29 | Stop reason: HOSPADM

## 2023-07-27 RX ORDER — DEXMEDETOMIDINE HYDROCHLORIDE 100 UG/ML
INJECTION, SOLUTION INTRAVENOUS
Status: DISCONTINUED | OUTPATIENT
Start: 2023-07-27 | End: 2023-07-27

## 2023-07-27 RX ORDER — ENOXAPARIN SODIUM 100 MG/ML
INJECTION SUBCUTANEOUS
Status: COMPLETED
Start: 2023-07-27 | End: 2023-07-27

## 2023-07-27 RX ORDER — MIDAZOLAM HYDROCHLORIDE 1 MG/ML
INJECTION INTRAMUSCULAR; INTRAVENOUS
Status: DISPENSED
Start: 2023-07-27 | End: 2023-07-27

## 2023-07-27 RX ORDER — SODIUM CHLORIDE 0.9 % (FLUSH) 0.9 %
SYRINGE (ML) INJECTION
Status: DISCONTINUED | OUTPATIENT
Start: 2023-07-27 | End: 2023-07-27 | Stop reason: HOSPADM

## 2023-07-27 RX ORDER — SUCCINYLCHOLINE CHLORIDE 20 MG/ML
INJECTION INTRAMUSCULAR; INTRAVENOUS
Status: DISCONTINUED | OUTPATIENT
Start: 2023-07-27 | End: 2023-07-27

## 2023-07-27 RX ORDER — PROMETHAZINE HYDROCHLORIDE 25 MG/1
25 TABLET ORAL EVERY 6 HOURS PRN
Status: DISCONTINUED | OUTPATIENT
Start: 2023-07-27 | End: 2023-07-29 | Stop reason: HOSPADM

## 2023-07-27 RX ORDER — ONDANSETRON 2 MG/ML
4 INJECTION INTRAMUSCULAR; INTRAVENOUS ONCE
Status: COMPLETED | OUTPATIENT
Start: 2023-07-27 | End: 2023-07-27

## 2023-07-27 RX ORDER — HYDROMORPHONE HYDROCHLORIDE 2 MG/ML
INJECTION, SOLUTION INTRAMUSCULAR; INTRAVENOUS; SUBCUTANEOUS
Status: DISCONTINUED | OUTPATIENT
Start: 2023-07-27 | End: 2023-07-27

## 2023-07-27 RX ORDER — BUPIVACAINE HYDROCHLORIDE 5 MG/ML
INJECTION, SOLUTION EPIDURAL; INTRACAUDAL
Status: DISCONTINUED | OUTPATIENT
Start: 2023-07-27 | End: 2023-07-27 | Stop reason: HOSPADM

## 2023-07-27 RX ORDER — ACETAMINOPHEN 10 MG/ML
1000 INJECTION, SOLUTION INTRAVENOUS EVERY 8 HOURS
Status: COMPLETED | OUTPATIENT
Start: 2023-07-27 | End: 2023-07-28

## 2023-07-27 RX ORDER — ALVIMOPAN 12 MG/1
12 CAPSULE ORAL ONCE
Status: COMPLETED | OUTPATIENT
Start: 2023-07-27 | End: 2023-07-27

## 2023-07-27 RX ORDER — ACETAMINOPHEN 10 MG/ML
INJECTION, SOLUTION INTRAVENOUS
Status: DISCONTINUED | OUTPATIENT
Start: 2023-07-27 | End: 2023-07-27

## 2023-07-27 RX ORDER — ENOXAPARIN SODIUM 100 MG/ML
40 INJECTION SUBCUTANEOUS EVERY 24 HOURS
Status: DISCONTINUED | OUTPATIENT
Start: 2023-07-28 | End: 2023-07-29 | Stop reason: HOSPADM

## 2023-07-27 RX ORDER — SODIUM CHLORIDE 0.9 % (FLUSH) 0.9 %
10 SYRINGE (ML) INJECTION
Status: DISCONTINUED | OUTPATIENT
Start: 2023-07-27 | End: 2023-07-27 | Stop reason: HOSPADM

## 2023-07-27 RX ORDER — ONDANSETRON 2 MG/ML
8 INJECTION INTRAMUSCULAR; INTRAVENOUS EVERY 8 HOURS PRN
Status: DISCONTINUED | OUTPATIENT
Start: 2023-07-27 | End: 2023-07-29 | Stop reason: HOSPADM

## 2023-07-27 RX ORDER — INSULIN ASPART 100 [IU]/ML
0-5 INJECTION, SOLUTION INTRAVENOUS; SUBCUTANEOUS
Status: DISCONTINUED | OUTPATIENT
Start: 2023-07-27 | End: 2023-07-29 | Stop reason: HOSPADM

## 2023-07-27 RX ORDER — LIDOCAINE HYDROCHLORIDE 20 MG/ML
INJECTION, SOLUTION EPIDURAL; INFILTRATION; INTRACAUDAL; PERINEURAL
Status: DISCONTINUED | OUTPATIENT
Start: 2023-07-27 | End: 2023-07-27

## 2023-07-27 RX ORDER — ONDANSETRON 2 MG/ML
INJECTION INTRAMUSCULAR; INTRAVENOUS
Status: DISPENSED
Start: 2023-07-27 | End: 2023-07-27

## 2023-07-27 RX ORDER — FENTANYL CITRATE 50 UG/ML
INJECTION, SOLUTION INTRAMUSCULAR; INTRAVENOUS
Status: DISCONTINUED | OUTPATIENT
Start: 2023-07-27 | End: 2023-07-27

## 2023-07-27 RX ORDER — ROCURONIUM BROMIDE 10 MG/ML
INJECTION, SOLUTION INTRAVENOUS
Status: DISCONTINUED | OUTPATIENT
Start: 2023-07-27 | End: 2023-07-27

## 2023-07-27 RX ORDER — KETOROLAC TROMETHAMINE 30 MG/ML
INJECTION, SOLUTION INTRAMUSCULAR; INTRAVENOUS
Status: DISCONTINUED | OUTPATIENT
Start: 2023-07-27 | End: 2023-07-27

## 2023-07-27 RX ORDER — PROPOFOL 10 MG/ML
VIAL (ML) INTRAVENOUS
Status: DISCONTINUED | OUTPATIENT
Start: 2023-07-27 | End: 2023-07-27

## 2023-07-27 RX ORDER — HYDROMORPHONE HYDROCHLORIDE 2 MG/ML
1 INJECTION, SOLUTION INTRAMUSCULAR; INTRAVENOUS; SUBCUTANEOUS
Status: DISCONTINUED | OUTPATIENT
Start: 2023-07-27 | End: 2023-07-29 | Stop reason: HOSPADM

## 2023-07-27 RX ORDER — ONDANSETRON HYDROCHLORIDE 2 MG/ML
INJECTION, SOLUTION INTRAMUSCULAR; INTRAVENOUS
Status: DISCONTINUED | OUTPATIENT
Start: 2023-07-27 | End: 2023-07-27

## 2023-07-27 RX ORDER — IBUPROFEN 200 MG
24 TABLET ORAL
Status: DISCONTINUED | OUTPATIENT
Start: 2023-07-27 | End: 2023-07-29 | Stop reason: HOSPADM

## 2023-07-27 RX ORDER — DIPHENHYDRAMINE HYDROCHLORIDE 50 MG/ML
12.5 INJECTION INTRAMUSCULAR; INTRAVENOUS EVERY 4 HOURS PRN
Status: DISCONTINUED | OUTPATIENT
Start: 2023-07-27 | End: 2023-07-29 | Stop reason: HOSPADM

## 2023-07-27 RX ORDER — MIDAZOLAM HYDROCHLORIDE 1 MG/ML
2 INJECTION INTRAMUSCULAR; INTRAVENOUS ONCE
Status: COMPLETED | OUTPATIENT
Start: 2023-07-27 | End: 2023-07-27

## 2023-07-27 RX ORDER — OXYCODONE HYDROCHLORIDE 5 MG/1
5 TABLET ORAL EVERY 4 HOURS PRN
Status: DISCONTINUED | OUTPATIENT
Start: 2023-07-27 | End: 2023-07-28

## 2023-07-27 RX ORDER — GLUCAGON 1 MG
1 KIT INJECTION
Status: DISCONTINUED | OUTPATIENT
Start: 2023-07-27 | End: 2023-07-29 | Stop reason: HOSPADM

## 2023-07-27 RX ORDER — NALOXONE HCL 0.4 MG/ML
0.02 VIAL (ML) INJECTION
Status: DISCONTINUED | OUTPATIENT
Start: 2023-07-27 | End: 2023-07-29 | Stop reason: HOSPADM

## 2023-07-27 RX ORDER — HYDROMORPHONE HYDROCHLORIDE 2 MG/ML
1 INJECTION, SOLUTION INTRAMUSCULAR; INTRAVENOUS; SUBCUTANEOUS EVERY 4 HOURS PRN
Status: DISCONTINUED | OUTPATIENT
Start: 2023-07-27 | End: 2023-07-29 | Stop reason: HOSPADM

## 2023-07-27 RX ORDER — DIPHENHYDRAMINE HYDROCHLORIDE 50 MG/ML
12.5 INJECTION INTRAMUSCULAR; INTRAVENOUS EVERY 6 HOURS PRN
Status: DISCONTINUED | OUTPATIENT
Start: 2023-07-27 | End: 2023-07-29 | Stop reason: HOSPADM

## 2023-07-27 RX ADMIN — MIDAZOLAM HYDROCHLORIDE 2 MG: 1 INJECTION, SOLUTION INTRAMUSCULAR; INTRAVENOUS at 06:07

## 2023-07-27 RX ADMIN — DEXMEDETOMIDINE 8 MCG: 200 INJECTION, SOLUTION INTRAVENOUS at 09:07

## 2023-07-27 RX ADMIN — DEXAMETHASONE SODIUM PHOSPHATE 4 MG: 4 INJECTION, SOLUTION INTRA-ARTICULAR; INTRALESIONAL; INTRAMUSCULAR; INTRAVENOUS; SOFT TISSUE at 07:07

## 2023-07-27 RX ADMIN — HYDROMORPHONE HYDROCHLORIDE 0.2 MG: 2 INJECTION INTRAMUSCULAR; INTRAVENOUS; SUBCUTANEOUS at 11:07

## 2023-07-27 RX ADMIN — LIDOCAINE HYDROCHLORIDE 80 MG: 20 INJECTION, SOLUTION EPIDURAL; INFILTRATION; INTRACAUDAL; PERINEURAL at 07:07

## 2023-07-27 RX ADMIN — ONDANSETRON 4 MG: 2 INJECTION INTRAMUSCULAR; INTRAVENOUS at 06:07

## 2023-07-27 RX ADMIN — KETOROLAC TROMETHAMINE 30 MG: 30 INJECTION, SOLUTION INTRAMUSCULAR; INTRAVENOUS at 10:07

## 2023-07-27 RX ADMIN — ENOXAPARIN SODIUM 30 MG: 30 INJECTION SUBCUTANEOUS at 06:07

## 2023-07-27 RX ADMIN — ONDANSETRON 4 MG: 2 INJECTION INTRAMUSCULAR; INTRAVENOUS at 09:07

## 2023-07-27 RX ADMIN — ROCURONIUM BROMIDE 5 MG: 10 SOLUTION INTRAVENOUS at 07:07

## 2023-07-27 RX ADMIN — HYDROMORPHONE HYDROCHLORIDE 0.2 MG: 2 INJECTION INTRAMUSCULAR; INTRAVENOUS; SUBCUTANEOUS at 10:07

## 2023-07-27 RX ADMIN — PROPOFOL 150 MG: 10 INJECTION, EMULSION INTRAVENOUS at 07:07

## 2023-07-27 RX ADMIN — OXYCODONE HYDROCHLORIDE 5 MG: 5 TABLET ORAL at 05:07

## 2023-07-27 RX ADMIN — ROCURONIUM BROMIDE 50 MG: 10 SOLUTION INTRAVENOUS at 07:07

## 2023-07-27 RX ADMIN — ACETAMINOPHEN 1000 MG: 10 INJECTION, SOLUTION INTRAVENOUS at 02:07

## 2023-07-27 RX ADMIN — ACETAMINOPHEN 1000 MG: 10 INJECTION, SOLUTION INTRAVENOUS at 10:07

## 2023-07-27 RX ADMIN — SUCCINYLCHOLINE CHLORIDE 120 MG: 20 INJECTION, SOLUTION INTRAMUSCULAR; INTRAVENOUS at 07:07

## 2023-07-27 RX ADMIN — SUGAMMADEX 200 MG: 100 INJECTION, SOLUTION INTRAVENOUS at 09:07

## 2023-07-27 RX ADMIN — METOPROLOL TARTRATE 100 MG: 50 TABLET, FILM COATED ORAL at 09:07

## 2023-07-27 RX ADMIN — PHENYLEPHRINE HYDROCHLORIDE 20 MCG/MIN: 10 INJECTION INTRAVENOUS at 08:07

## 2023-07-27 RX ADMIN — ROCURONIUM BROMIDE 45 MG: 10 SOLUTION INTRAVENOUS at 07:07

## 2023-07-27 RX ADMIN — ERTAPENEM 1 G: 1 INJECTION INTRAMUSCULAR; INTRAVENOUS at 07:07

## 2023-07-27 RX ADMIN — DEXMEDETOMIDINE 4 MCG: 200 INJECTION, SOLUTION INTRAVENOUS at 09:07

## 2023-07-27 RX ADMIN — OXYCODONE HYDROCHLORIDE 5 MG: 5 TABLET ORAL at 11:07

## 2023-07-27 RX ADMIN — SODIUM CHLORIDE, SODIUM GLUCONATE, SODIUM ACETATE, POTASSIUM CHLORIDE AND MAGNESIUM CHLORIDE: 526; 502; 368; 37; 30 INJECTION, SOLUTION INTRAVENOUS at 07:07

## 2023-07-27 RX ADMIN — HYDROMORPHONE HYDROCHLORIDE 1 MG: 2 INJECTION INTRAMUSCULAR; INTRAVENOUS; SUBCUTANEOUS at 07:07

## 2023-07-27 RX ADMIN — FENTANYL CITRATE 50 MCG: 50 INJECTION, SOLUTION INTRAMUSCULAR; INTRAVENOUS at 07:07

## 2023-07-27 RX ADMIN — ROCURONIUM BROMIDE 20 MG: 10 SOLUTION INTRAVENOUS at 09:07

## 2023-07-27 RX ADMIN — ALVIMOPAN 12 MG: 12 CAPSULE ORAL at 09:07

## 2023-07-27 RX ADMIN — HYDROMORPHONE HYDROCHLORIDE 0.6 MG: 2 INJECTION, SOLUTION INTRAMUSCULAR; INTRAVENOUS; SUBCUTANEOUS at 10:07

## 2023-07-27 RX ADMIN — ALVIMOPAN 12 MG: 12 CAPSULE ORAL at 06:07

## 2023-07-27 NOTE — ANESTHESIA PROCEDURE NOTES
Intubation    Date/Time: 7/27/2023 7:12 AM  Performed by: Mabel Paredes  Authorized by: Ny Nance MD     Intubation:     Induction:  Intravenous    Intubated:  Postinduction    Mask Ventilation:  Not attempted    Attempts:  1    Attempted By:  Student    Method of Intubation:  Video laryngoscopy    Blade:  Yasmeen 3    Laryngeal View Grade: Grade I - full view of cords      Difficult Airway Encountered?: No      Complications:  None    Airway Device:  Oral endotracheal tube    Airway Device Size:  7.5    Style/Cuff Inflation:  Cuffed (inflated to minimal occlusive pressure)    Tube secured:  23    Secured at:  The lips    Placement Verified By:  Capnometry    Complicating Factors:  None    Findings Post-Intubation:  BS equal bilateral and atraumatic/condition of teeth unchanged

## 2023-07-27 NOTE — NURSING
Nurses Note -- 4 Eyes      7/27/2023   6:23 PM      Skin assessed during: Admit      [x] No Altered Skin Integrity Present    [x]Prevention Measures Documented      [] Yes- Altered Skin Integrity Present or Discovered   [] LDA Added if Not in Epic (Describe Wound)   [] New Altered Skin Integrity was Present on Admit and Documented in LDA   [] Wound Image Taken    Wound Care Consulted? No    Attending Nurse:  Amee Fernández RN     Second RN/Staff Member:  Kina Bright LPN

## 2023-07-27 NOTE — ANESTHESIA PREPROCEDURE EVALUATION
07/27/2023  Marci Craft is a 58 y.o., female w/ abd pain: CT abd/pel showed a mass involving the distal body and tail of the pancreas with soft tissue stranding extending into the peripancreatic tissue which may represent superimposed pancreatitis.  EUS was performed, a parenchymal mass was noted in the pancreas; biopsy was not performed.  MRI abdomen in April 2023 showed a 6.7 x 3.8 x 3.8 cm heterogeneously enhancing mass within the pancreas, slightly decreased from previous imaging.  EUS performed in June 2023, parenchymal mass again noted in the pancreas with ductal dilation in the pancreas.  Biopsy of the mass revealed well-differentiated neuroendocrine tumor, grade 2 of 3.  The patient denies abdominal pain, nausea, vomiting, anorexia, weight loss, diarrhea or flushing.    Has h/o Low Ef CHF, Pacemaker/AICD, MVR(bioprosthetic)  Multiple ortho sxs without issues      Pre-op Assessment    I have reviewed the Patient Summary Reports.     I have reviewed the Nursing Notes. I have reviewed the NPO Status.   I have reviewed the Medications.     Review of Systems  Anesthesia Hx:  No problems with previous Anesthesia    Social:  Non-Smoker    Hematology/Oncology:        Current/Recent Cancer.   Cardiovascular:   Hypertension CHF    Pulmonary:   Shortness of breath    Hepatic/GI:   PUD,    Musculoskeletal:   Arthritis     Psych:   Psychiatric History          Physical Exam  General: Well nourished, Cooperative, Alert and Oriented    Airway:  Mallampati: II   Mouth Opening: Normal  TM Distance: Normal  Tongue: Normal  Neck ROM: Normal ROM    Dental:  Intact        Anesthesia Plan  Type of Anesthesia, risks & benefits discussed:    Anesthesia Type: Gen ETT  Intra-op Monitoring Plan: Standard ASA Monitors and Art Line  Post Op Pain Control Plan: multimodal analgesia and IV/PO Opioids PRN  Induction:  rapid  sequence  Airway Plan: Direct, Post-Induction  Informed Consent: Informed consent signed with the Patient and all parties understand the risks and agree with anesthesia plan.  All questions answered. Patient consented to blood products? Yes  ASA Score: 3  Day of Surgery Review of History & Physical: H&P Update referred to the surgeon/provider.    Ready For Surgery From Anesthesia Perspective.     .

## 2023-07-27 NOTE — ANESTHESIA PROCEDURE NOTES
Arterial    Diagnosis: Pancreatic mass    Patient location during procedure: holding area  Procedure start time: 7/27/2023 6:41 AM  Timeout: 7/27/2023 6:41 AM  Procedure end time: 7/27/2023 6:43 AM    Staffing  Authorizing Provider: Ny Nance MD  Performing Provider: Ny Nance MD    Anesthesiologist was present at the time of the procedure.    Preanesthetic Checklist  Completed: patient identified, IV checked, site marked, risks and benefits discussed, surgical consent, monitors and equipment checked, pre-op evaluation, timeout performed and anesthesia consent givenArterial  Skin Prep: chlorhexidine gluconate  Local Infiltration: lidocaine  Orientation: left  Location: radial    Catheter Size: 20 G  Catheter placement by Anatomical landmarks. Heme positive aspiration all ports. Insertion Attempts: 1  Assessment  Dressing: secured with tape and tegaderm  Patient: Tolerated well

## 2023-07-27 NOTE — TRANSFER OF CARE
"Anesthesia Transfer of Care Note    Patient: Marci Craft    Procedure(s) Performed: Procedure(s) (LRB):  ROBOTIC PANCREATECTOMY (N/A)    Patient location: PACU    Anesthesia Type: general    Transport from OR: Transported from OR on room air with adequate spontaneous ventilation    Post pain: adequate analgesia    Post assessment: no apparent anesthetic complications    Post vital signs: stable    Level of consciousness: sedated    Nausea/Vomiting: no nausea/vomiting    Complications: none    Transfer of care protocol was followed      Last vitals:   Visit Vitals  BP (!) 158/81 (BP Location: Left arm, Patient Position: Sitting)   Pulse 75   Temp 36.7 °C (98 °F) (Oral)   Resp 18   Ht 5' 8" (1.727 m)   Wt 94.1 kg (207 lb 7.3 oz)   SpO2 100%   Breastfeeding No   BMI 31.54 kg/m²     "

## 2023-07-28 LAB
ALBUMIN SERPL-MCNC: 3.3 G/DL (ref 3.5–5)
ALBUMIN/GLOB SERPL: 0.9 RATIO (ref 1.1–2)
ALP SERPL-CCNC: 44 UNIT/L (ref 40–150)
ALT SERPL-CCNC: 34 UNIT/L (ref 0–55)
AMYLASE FLD-CCNC: 9634 U/L
APTT PPP: 21.1 SECONDS (ref 23.2–33.7)
AST SERPL-CCNC: 46 UNIT/L (ref 5–34)
BASOPHILS # BLD AUTO: 0.03 X10(3)/MCL
BASOPHILS NFR BLD AUTO: 0.3 %
BILIRUBIN DIRECT+TOT PNL SERPL-MCNC: 0.3 MG/DL
BUN SERPL-MCNC: 8.8 MG/DL (ref 9.8–20.1)
CALCIUM SERPL-MCNC: 8.4 MG/DL (ref 8.4–10.2)
CHLORIDE SERPL-SCNC: 100 MMOL/L (ref 98–107)
CO2 SERPL-SCNC: 28 MMOL/L (ref 22–29)
CREAT SERPL-MCNC: 0.64 MG/DL (ref 0.55–1.02)
EOSINOPHIL # BLD AUTO: 0.09 X10(3)/MCL (ref 0–0.9)
EOSINOPHIL NFR BLD AUTO: 1 %
ERYTHROCYTE [DISTWIDTH] IN BLOOD BY AUTOMATED COUNT: 15.4 % (ref 11.5–17)
GFR SERPLBLD CREATININE-BSD FMLA CKD-EPI: >60 MLS/MIN/1.73/M2
GLOBULIN SER-MCNC: 3.7 GM/DL (ref 2.4–3.5)
GLUCOSE SERPL-MCNC: 111 MG/DL (ref 74–100)
HCT VFR BLD AUTO: 31.7 % (ref 37–47)
HGB BLD-MCNC: 10.4 G/DL (ref 12–16)
IMM GRANULOCYTES # BLD AUTO: 0.03 X10(3)/MCL (ref 0–0.04)
IMM GRANULOCYTES NFR BLD AUTO: 0.3 %
INR PPP: 1
LYMPHOCYTES # BLD AUTO: 1.06 X10(3)/MCL (ref 0.6–4.6)
LYMPHOCYTES NFR BLD AUTO: 11.8 %
MAGNESIUM SERPL-MCNC: 1.8 MG/DL (ref 1.6–2.6)
MCH RBC QN AUTO: 28 PG (ref 27–31)
MCHC RBC AUTO-ENTMCNC: 32.8 G/DL (ref 33–36)
MCV RBC AUTO: 85.4 FL (ref 80–94)
MONOCYTES # BLD AUTO: 0.64 X10(3)/MCL (ref 0.1–1.3)
MONOCYTES NFR BLD AUTO: 7.1 %
NEUTROPHILS # BLD AUTO: 7.16 X10(3)/MCL (ref 2.1–9.2)
NEUTROPHILS NFR BLD AUTO: 79.5 %
NRBC BLD AUTO-RTO: 0.9 %
PLATELET # BLD AUTO: 298 X10(3)/MCL (ref 130–400)
PMV BLD AUTO: 9.8 FL (ref 7.4–10.4)
POCT GLUCOSE: 113 MG/DL (ref 70–110)
POCT GLUCOSE: 134 MG/DL (ref 70–110)
POCT GLUCOSE: 138 MG/DL (ref 70–110)
POCT GLUCOSE: 139 MG/DL (ref 70–110)
POTASSIUM SERPL-SCNC: 3.9 MMOL/L (ref 3.5–5.1)
PROT SERPL-MCNC: 7 GM/DL (ref 6.4–8.3)
PROTHROMBIN TIME: 12.8 SECONDS (ref 12.5–14.5)
RBC # BLD AUTO: 3.71 X10(6)/MCL (ref 4.2–5.4)
SODIUM SERPL-SCNC: 138 MMOL/L (ref 136–145)
WBC # SPEC AUTO: 9.01 X10(3)/MCL (ref 4.5–11.5)

## 2023-07-28 PROCEDURE — 25000003 PHARM REV CODE 250: Performed by: SURGERY

## 2023-07-28 PROCEDURE — 80053 COMPREHEN METABOLIC PANEL: CPT | Performed by: NURSE PRACTITIONER

## 2023-07-28 PROCEDURE — 63600175 PHARM REV CODE 636 W HCPCS: Performed by: NURSE PRACTITIONER

## 2023-07-28 PROCEDURE — 25000003 PHARM REV CODE 250: Performed by: NURSE PRACTITIONER

## 2023-07-28 PROCEDURE — 85025 COMPLETE CBC W/AUTO DIFF WBC: CPT | Performed by: NURSE PRACTITIONER

## 2023-07-28 PROCEDURE — 85730 THROMBOPLASTIN TIME PARTIAL: CPT | Performed by: NURSE PRACTITIONER

## 2023-07-28 PROCEDURE — 11000001 HC ACUTE MED/SURG PRIVATE ROOM

## 2023-07-28 PROCEDURE — 83735 ASSAY OF MAGNESIUM: CPT | Performed by: NURSE PRACTITIONER

## 2023-07-28 PROCEDURE — 82150 ASSAY OF AMYLASE: CPT | Performed by: NURSE PRACTITIONER

## 2023-07-28 PROCEDURE — 85610 PROTHROMBIN TIME: CPT | Performed by: NURSE PRACTITIONER

## 2023-07-28 RX ORDER — HYDROCODONE BITARTRATE AND ACETAMINOPHEN 7.5; 325 MG/1; MG/1
1 TABLET ORAL EVERY 4 HOURS PRN
Status: DISCONTINUED | OUTPATIENT
Start: 2023-07-28 | End: 2023-07-29 | Stop reason: HOSPADM

## 2023-07-28 RX ORDER — AMLODIPINE BESYLATE 5 MG/1
5 TABLET ORAL DAILY
Status: DISCONTINUED | OUTPATIENT
Start: 2023-07-28 | End: 2023-07-29 | Stop reason: HOSPADM

## 2023-07-28 RX ADMIN — METOPROLOL TARTRATE 100 MG: 50 TABLET, FILM COATED ORAL at 09:07

## 2023-07-28 RX ADMIN — ENOXAPARIN SODIUM 40 MG: 40 INJECTION SUBCUTANEOUS at 05:07

## 2023-07-28 RX ADMIN — OXYCODONE HYDROCHLORIDE 5 MG: 5 TABLET ORAL at 03:07

## 2023-07-28 RX ADMIN — HYDROCODONE BITARTRATE AND ACETAMINOPHEN 1 TABLET: 7.5; 325 TABLET ORAL at 05:07

## 2023-07-28 RX ADMIN — ACETAMINOPHEN 1000 MG: 10 INJECTION, SOLUTION INTRAVENOUS at 03:07

## 2023-07-28 RX ADMIN — METOPROLOL TARTRATE 100 MG: 50 TABLET, FILM COATED ORAL at 08:07

## 2023-07-28 RX ADMIN — HYDROMORPHONE HYDROCHLORIDE 1 MG: 2 INJECTION INTRAMUSCULAR; INTRAVENOUS; SUBCUTANEOUS at 12:07

## 2023-07-28 RX ADMIN — HYDROMORPHONE HYDROCHLORIDE 1 MG: 2 INJECTION INTRAMUSCULAR; INTRAVENOUS; SUBCUTANEOUS at 11:07

## 2023-07-28 RX ADMIN — ACETAMINOPHEN 1000 MG: 10 INJECTION, SOLUTION INTRAVENOUS at 11:07

## 2023-07-28 RX ADMIN — HYDROMORPHONE HYDROCHLORIDE 1 MG: 2 INJECTION INTRAMUSCULAR; INTRAVENOUS; SUBCUTANEOUS at 09:07

## 2023-07-28 RX ADMIN — AMLODIPINE BESYLATE 5 MG: 5 TABLET ORAL at 08:07

## 2023-07-28 RX ADMIN — ALVIMOPAN 12 MG: 12 CAPSULE ORAL at 08:07

## 2023-07-28 RX ADMIN — ALVIMOPAN 12 MG: 12 CAPSULE ORAL at 09:07

## 2023-07-28 RX ADMIN — OXYCODONE HYDROCHLORIDE 5 MG: 5 TABLET ORAL at 06:07

## 2023-07-28 RX ADMIN — SODIUM CHLORIDE, POTASSIUM CHLORIDE, SODIUM LACTATE AND CALCIUM CHLORIDE: 600; 310; 30; 20 INJECTION, SOLUTION INTRAVENOUS at 02:07

## 2023-07-28 RX ADMIN — HYDROMORPHONE HYDROCHLORIDE 1 MG: 2 INJECTION INTRAMUSCULAR; INTRAVENOUS; SUBCUTANEOUS at 05:07

## 2023-07-28 NOTE — OP NOTE
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 Laparoscopic/robotic distal pancreatectomy/splenectomy with intraoperative ultrasound guidance   2. Complete intraoperative diagnostic ultrasound of the pancreas  3. Complete intraoperative diagnostic ultrasound of the liver  4. Complete peripancreatic/splenic/celiac lymphadenectomy    5. Diagnostic laparoscopy     Anesthesia:  GETA    EBL:  50 cc    Specimens:  Distal pancreatectomy/splenectomy with EN bloc lymphadenectomy    Findings:  No evidence of metastatic disease on initial diagnostic laparoscopy or complete intraoperative diagnostic ultrasound of the liver that would preclude an aggressive approach to the primary tumor.  Ultrasound of the pancreas revealed a well-circumscribed hypoechoic tumor occupying the entire tail of the gland and extending towards the body.  There was no evidence of invasion of vascular structures or adjacent structures.  Distal pancreatectomy was performed with the pancreas divided at the splenoportal confluence, the staple line was intact with no evidence of fracturing.  EN bloc complete lymphadenectomy was performed with high ligation of the splenic artery at its origin.    Procedure in detail:  After informed consent was obtained patient brought to operating placed supine position.  General endotracheal anesthesia administered difficulty.  The patient was placed in split leg position with pressure points carefully padded.  The abdomen was prepped and draped in sterile fashion.  Under ultrasound guidance I performed bilateral transversus abdominis plane nerve blocks using liposomal bupivacaine.  Incision was made to the left of the umbilicus in the epigastrium optical trocar used into peritoneal cavity under direct  vision.  Pneumoperitoneum was achieved without difficulty.  Additional ports were placed under direct vision.  The abdomen was explored laparoscopically visualizing all visceral and parietal peritoneal surfaces and carefully examining the surface of the liver.  There was no evidence of metastatic disease.  Using the drop in probe I performed complete intraoperative diagnostic ultrasound of the liver visualizing all segments.  There was no evidence of intraparenchymal lesions or metastatic disease that would preclude an aggressive approach to the primary tumor therefore we proceeded.  The robot was docked after the patient was placed in reverse Trendelenburg.  The gastrocolic ligament was incised and the lesser sac opened widely.  A self-retaining retractor was placed.  The anterior surface of the pancreas was widely exposed and using the drop in probe I performed intraoperative diagnostic ultrasound of the pancreas visualizing the entire gland.  There was a hypoechoic mass in the tail and extending to the body with no evidence of vascular invasion or local invasion of structures.  The vasculature was carefully identified using ultrasound for orientation.  Ultrasound guidance was then used to plan pancreatic resection.  The lesser sac was opened through the lesser omentum and the lymph nodes were dissected off of the hepatic artery and swept to the left to be included in the specimen the lymph nodes over the celiac were also dissected EN bloc and the origin of the splenic artery was identified and confirmed with the test clamp.  The splenic artery was then dissected circumferentially and divided using a vascular load stapler.  The inferior border of the pancreas was dissected at the pancreatic neck and the inferior mesenteric vein was divided between hemoclips.  The plane anterior to the portal splenic confluence was carefully meticulously dissected using blunt dissection.  The splenic vein was dissected  circumferentially with care to avoid the left gastric vein.  The left gastric vein was entering at the junction of the splenoportal confluence and therefore it was divided between clips after circumferential dissection.  The splenic vein was then divided using a vascular load stapler.  The pancreas was then divided using a blue load laparoscopic stapler with slow compression there was no evidence of fracturing of the gland and the staple line was intact and hemostatic.  The body and tail of the pancreas were then dissected out of the retroperitoneum using gentle blunt dissection and the vessel sealing device.  The splenic flexure of the colon was mobilized off the inferior pole of the spleen.  The peripancreatic and splenic lymph nodes were included EN bloc dissecting them out with the specimen.  The peritoneal attachments of the spleen were divided using the vessel sealer and the spleen was completely mobilized out of the retroperitoneum.  The short gastric vessels had been previously divided during the mobilization of the greater curvature of the stomach.  The spleen was then detached from the specimen along the hilum using the vessel sealer.  The area was inspected for hemostasis which appeared to be excellent.  The pancreas and spleen specimens were placed in separate Endo-Catch bags and extracted through the stapler port site, with the spleen partially morselized to allow passage through the small extraction site.  A 19 Ukrainian Shyam drain was then left near the pancreatic transection margin and extending into the left subdiaphragmatic space.  The abdomen was again inspected for hemostasis which appeared to be excellent.  Ports removed pneumoperitoneum was relieved port sites were closed absorbable suture sterile dressings were applied the patient tolerated the procedure well    Significant surgical tasks conducted by the assistant:  The skilled assistance of the nurse practitioner ROMINA Israel was necessary  for the successful completion of this case.  She was essential for the proper positioning of the patient, manipulation of instruments, proper exposure, manipulation of tissue, and wound closure        Ab Das MD  Surgical Oncology  Complex General, Gastrointestinal and Hepatobiliary Surgery

## 2023-07-28 NOTE — PROGRESS NOTES
"Inpatient Nutrition Evaluation    Admit Date: 7/27/2023   Total duration of encounter: 1 day    Nutrition Recommendation/Prescription     -Advance diet as tolerated per MD. Goal Diet: Low Residue Diet.   -Consider Pancreatic Enzyme Replacement Therapy with meals as medically feasible.   -Monitor wt, labs, and intake.     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 Laparoscopic/robotic distal pancreatectomy/splenectomy with intraoperative ultrasound guidance   2. Complete intraoperative diagnostic ultrasound of the pancreas  3. Complete intraoperative diagnostic ultrasound of the liver  4. Complete peripancreatic/splenic/celiac lymphadenectomy    5. Diagnostic laparoscopy     Relevant Medical History:   Abdominal pain   Anxiety   Arthritis   CHF (congestive heart failure)   Depression   Diarrhea   Gout   Hypertension   Indigestion   Nausea & vomiting   Obesity   Pacemaker   Paroxysmal A-fib   Peptic ulcer disease   Primary pancreatic neuroendocrine tumor   SOB (shortness of breath) on exertion       Nutrition-Related Medications: SSI, zofran PRN    Nutrition-Related Labs:  7/28/23: Glu 111, GFR>60    Diet Order: Diet full liquid  Oral Supplement Order: none  Appetite/Oral Intake: good/% of meals  Factors Affecting Nutritional Intake: none identified  Food/Jewish/Cultural Preferences: none reported  Food Allergies: no known food allergies    Skin Integrity: incision  Wound(s):   surgical incision     Comments    7/28/23: Pt reports good appetite/intake with usual good appetite/intake prior to admit; denies n/v and wt loss but does report diarrhea.     Anthropometrics    Height: 5' 7.99" (172.7 cm) " Height Method: Stated  Last Weight: 94.1 kg (207 lb 7.3 oz) (23 1043) Weight Method: Standard Scale  BMI (Calculated): 31.6  BMI Classification: obese grade I (BMI 30-34.9)     Ideal Body Weight (IBW), Female: 139.95 lb     % Ideal Body Weight, Female (lb): 148.23 %                    Usual Body Weight (UBW), k.1 kg  % Usual Body Weight: 100.21  % Weight Change From Usual Weight: 0 %  Usual Weight Provided By: patient    Wt Readings from Last 5 Encounters:   23 94.1 kg (207 lb 7.3 oz)   23 94.8 kg (209 lb)   17 73.2 kg (161 lb 6 oz)   01/15/21 87.1 kg (192 lb)   20 87.1 kg (192 lb)     Weight Change(s) Since Admission:  Admit Weight: 95.3 kg (210 lb) (23 1003)      Patient Education    Not applicable.    Monitoring & Evaluation     Dietitian will monitor energy intake and weight.  Nutrition Risk/Follow-Up: low (follow-up in 5-7 days)  Patients assigned 'low nutrition risk' status do not qualify for a full nutritional assessment but will be monitored and re-evaluated in a 5-7 day time period. Please consult if re-evaluation needed sooner.

## 2023-07-28 NOTE — PROGRESS NOTES
SURG ONC POD 1    PT DOING VERY WELL  REPORTS SOME ISSUES WITH PAIN CONTROL  HX PAIN MEDICATION AT HOME  TOLERATING CLEARS  NO NAUSEA OR VOMITING  AMBULATING    ABD SOFT  DRESSINGS CLEAN AND DRY  DRAIN LIGHT BROWN  DRAIN AMYLASE PENDING    VSS; NO FEVER  WBC 9000   10/31  LYTE REVIEWED  UO MORE THAN ADEQUATE    PLAN  HL IV  ADV DIET  HOME LATER TODAY OR TOMORROW IF ALL OK

## 2023-07-28 NOTE — ANESTHESIA POSTPROCEDURE EVALUATION
Anesthesia Post Evaluation    Patient: Marci Craft    Procedure(s) Performed: Procedure(s) (LRB):  ROBOTIC PANCREATECTOMY (N/A)    Final Anesthesia Type: general      Patient location during evaluation: PACU  Patient participation: Yes- Able to Participate  Level of consciousness: awake and alert  Post-procedure vital signs: reviewed and stable  Pain management: adequate  Airway patency: patent      Anesthetic complications: no      Cardiovascular status: hemodynamically stable  Respiratory status: unassisted  Hydration status: euvolemic  Follow-up not needed.          Vitals Value Taken Time   /76 07/27/23 1331   Temp 36.3 °C (97.3 °F) 07/27/23 1023   Pulse 78 07/27/23 1349   Resp 17 07/27/23 1349   SpO2 95 % 07/27/23 1349   Vitals shown include unvalidated device data.      Event Time   Out of Recovery 12:23:00         Pain/Alexis Score: Pain Rating Prior to Med Admin: 6 (7/28/2023 11:06 AM)  Pain Rating Post Med Admin: 3 (7/28/2023 11:35 AM)  Alexis Score: 10 (7/27/2023  2:05 PM)

## 2023-07-29 VITALS
HEART RATE: 89 BPM | DIASTOLIC BLOOD PRESSURE: 85 MMHG | RESPIRATION RATE: 18 BRPM | HEIGHT: 68 IN | BODY MASS INDEX: 31.44 KG/M2 | OXYGEN SATURATION: 97 % | WEIGHT: 207.44 LBS | TEMPERATURE: 98 F | SYSTOLIC BLOOD PRESSURE: 137 MMHG

## 2023-07-29 PROBLEM — D3A.8 PRIMARY PANCREATIC NEUROENDOCRINE TUMOR: Status: ACTIVE | Noted: 2023-07-29

## 2023-07-29 LAB
ALBUMIN SERPL-MCNC: 2.9 G/DL (ref 3.5–5)
ALBUMIN/GLOB SERPL: 0.8 RATIO (ref 1.1–2)
ALP SERPL-CCNC: 53 UNIT/L (ref 40–150)
ALT SERPL-CCNC: 30 UNIT/L (ref 0–55)
APTT PPP: 31 SECONDS (ref 23.2–33.7)
AST SERPL-CCNC: 33 UNIT/L (ref 5–34)
BASOPHILS # BLD AUTO: 0.07 X10(3)/MCL
BASOPHILS NFR BLD AUTO: 0.5 %
BILIRUBIN DIRECT+TOT PNL SERPL-MCNC: 0.4 MG/DL
BUN SERPL-MCNC: 4.4 MG/DL (ref 9.8–20.1)
CALCIUM SERPL-MCNC: 8.7 MG/DL (ref 8.4–10.2)
CHLORIDE SERPL-SCNC: 102 MMOL/L (ref 98–107)
CO2 SERPL-SCNC: 28 MMOL/L (ref 22–29)
CREAT SERPL-MCNC: 0.64 MG/DL (ref 0.55–1.02)
EOSINOPHIL # BLD AUTO: 0.08 X10(3)/MCL (ref 0–0.9)
EOSINOPHIL NFR BLD AUTO: 0.6 %
ERYTHROCYTE [DISTWIDTH] IN BLOOD BY AUTOMATED COUNT: 15.4 % (ref 11.5–17)
GFR SERPLBLD CREATININE-BSD FMLA CKD-EPI: >60 MLS/MIN/1.73/M2
GLOBULIN SER-MCNC: 3.5 GM/DL (ref 2.4–3.5)
GLUCOSE SERPL-MCNC: 133 MG/DL (ref 74–100)
HCT VFR BLD AUTO: 26.3 % (ref 37–47)
HGB BLD-MCNC: 8.6 G/DL (ref 12–16)
IMM GRANULOCYTES # BLD AUTO: 0.08 X10(3)/MCL (ref 0–0.04)
IMM GRANULOCYTES NFR BLD AUTO: 0.6 %
INR PPP: 1.2
LYMPHOCYTES # BLD AUTO: 1.38 X10(3)/MCL (ref 0.6–4.6)
LYMPHOCYTES NFR BLD AUTO: 10.4 %
MAGNESIUM SERPL-MCNC: 1.7 MG/DL (ref 1.6–2.6)
MCH RBC QN AUTO: 27.7 PG (ref 27–31)
MCHC RBC AUTO-ENTMCNC: 32.7 G/DL (ref 33–36)
MCV RBC AUTO: 84.8 FL (ref 80–94)
MONOCYTES # BLD AUTO: 1.09 X10(3)/MCL (ref 0.1–1.3)
MONOCYTES NFR BLD AUTO: 8.2 %
NEUTROPHILS # BLD AUTO: 10.53 X10(3)/MCL (ref 2.1–9.2)
NEUTROPHILS NFR BLD AUTO: 79.7 %
NRBC BLD AUTO-RTO: 0.5 %
PLATELET # BLD AUTO: 289 X10(3)/MCL (ref 130–400)
PMV BLD AUTO: 9.8 FL (ref 7.4–10.4)
POCT GLUCOSE: 124 MG/DL (ref 70–110)
POTASSIUM SERPL-SCNC: 3.1 MMOL/L (ref 3.5–5.1)
PROT SERPL-MCNC: 6.4 GM/DL (ref 6.4–8.3)
PROTHROMBIN TIME: 14.6 SECONDS (ref 12.5–14.5)
RBC # BLD AUTO: 3.1 X10(6)/MCL (ref 4.2–5.4)
SODIUM SERPL-SCNC: 138 MMOL/L (ref 136–145)
WBC # SPEC AUTO: 13.23 X10(3)/MCL (ref 4.5–11.5)

## 2023-07-29 PROCEDURE — 85025 COMPLETE CBC W/AUTO DIFF WBC: CPT | Performed by: NURSE PRACTITIONER

## 2023-07-29 PROCEDURE — 63600175 PHARM REV CODE 636 W HCPCS: Performed by: NURSE PRACTITIONER

## 2023-07-29 PROCEDURE — 25000003 PHARM REV CODE 250: Performed by: NURSE PRACTITIONER

## 2023-07-29 PROCEDURE — 85610 PROTHROMBIN TIME: CPT | Performed by: NURSE PRACTITIONER

## 2023-07-29 PROCEDURE — 85730 THROMBOPLASTIN TIME PARTIAL: CPT | Performed by: NURSE PRACTITIONER

## 2023-07-29 PROCEDURE — 83735 ASSAY OF MAGNESIUM: CPT | Performed by: NURSE PRACTITIONER

## 2023-07-29 PROCEDURE — 25000003 PHARM REV CODE 250: Performed by: SURGERY

## 2023-07-29 PROCEDURE — 80053 COMPREHEN METABOLIC PANEL: CPT | Performed by: NURSE PRACTITIONER

## 2023-07-29 RX ORDER — HYDROCODONE BITARTRATE AND ACETAMINOPHEN 7.5; 325 MG/1; MG/1
1 TABLET ORAL EVERY 6 HOURS PRN
Qty: 20 TABLET | Refills: 0 | Status: SHIPPED | OUTPATIENT
Start: 2023-07-29

## 2023-07-29 RX ADMIN — ONDANSETRON 8 MG: 2 INJECTION INTRAMUSCULAR; INTRAVENOUS at 04:07

## 2023-07-29 RX ADMIN — ENOXAPARIN SODIUM 40 MG: 40 INJECTION SUBCUTANEOUS at 09:07

## 2023-07-29 RX ADMIN — HYDROCODONE BITARTRATE AND ACETAMINOPHEN 1 TABLET: 7.5; 325 TABLET ORAL at 04:07

## 2023-07-29 RX ADMIN — METOPROLOL TARTRATE 100 MG: 50 TABLET, FILM COATED ORAL at 09:07

## 2023-07-29 RX ADMIN — AMLODIPINE BESYLATE 5 MG: 5 TABLET ORAL at 09:07

## 2023-07-29 RX ADMIN — HYDROCODONE BITARTRATE AND ACETAMINOPHEN 1 TABLET: 7.5; 325 TABLET ORAL at 09:07

## 2023-07-29 RX ADMIN — ALVIMOPAN 12 MG: 12 CAPSULE ORAL at 09:07

## 2023-07-29 NOTE — HOSPITAL COURSE
The patient was admitted for pancreatectomy their hospital course was unremarkable, see progress notes for full details.  When they were afebrile, tolerating a diet and having bowel function they were ready for discharge.  Detailed instructions were given.

## 2023-07-29 NOTE — DISCHARGE SUMMARY
[facility] - 8th Floor Med Surg  General Surgery  Discharge Summary      Patient Name: Marci Craft  MRN: [id number]  Admission Date: 7/27/2023  Hospital Length of Stay: 2 days  Discharge Date and Time: No discharge date for patient encounter.  Attending Physician: Ab Das MD   Discharging Provider: Ab Das MD  Primary Care Provider: Basilio Valle DO    HPI:   No notes on file    Procedure(s) (LRB):  ROBOTIC PANCREATECTOMY (N/A)      Indwelling Lines/Drains at time of discharge:   Lines/Drains/Airways     Drain  Duration                Closed/Suction Drain 07/27/23 0915 Lateral LLQ Bulb 19 Fr. 1 day              Hospital Course: The patient was admitted for pancreatectomy their hospital course was unremarkable, see progress notes for full details.  When they were afebrile, tolerating a diet and having bowel function they were ready for discharge.  Detailed instructions were given.        Goals of Care Treatment Preferences:  Code Status: Full Code      Consults:     Significant Diagnostic Studies: Labs:   CBC   Recent Labs   Lab 07/28/23  0646 07/29/23  0444   WBC 9.01 13.23*   HGB 10.4* 8.6*   HCT 31.7* 26.3*    289       Pending Diagnostic Studies:     Procedure Component Value Units Date/Time    Specimen to Pathology [[id number]] Collected: 07/27/23 0948    Order Status: Sent Lab Status: In process Updated: 07/27/23 1044    Specimen: Tissue from Pancreas         Final Active Diagnoses:    Diagnosis Date Noted POA    PRINCIPAL PROBLEM:  Primary pancreatic neuroendocrine tumor [D3A.8] 07/29/2023 Yes      Problems Resolved During this Admission:      Discharged Condition: good    Disposition: Home or Self Care    Follow Up:   Follow-up Information     Ab Das MD Follow up in 1 week(s).    Specialty: Surgical Oncology  Contact information:  [address] [place] [address]  [address] [place] [id number] [phone]                       Patient Instructions:   No discharge  procedures on file.  Medications:  Reconciled Home Medications:      Medication List      CHANGE how you take these medications    * HYDROcodone-acetaminophen 5-325 mg per tablet  Commonly known as: NORCO  SMARTSI Tablet(s) By Mouth Every 4 Hours  What changed: Another medication with the same name was added. Make sure you understand how and when to take each.     * HYDROcodone-acetaminophen 7.5-325 mg per tablet  Commonly known as: NORCO  Take 1 tablet by mouth every 6 (six) hours as needed for Pain.  What changed: You were already taking a medication with the same name, and this prescription was added. Make sure you understand how and when to take each.         * This list has 2 medication(s) that are the same as other medications prescribed for you. Read the directions carefully, and ask your doctor or other care provider to review them with you.            CONTINUE taking these medications    acetaminophen 650 MG Tbsr  Commonly known as: TYLENOL  Take 650 mg by mouth as needed.     amLODIPine 5 MG tablet  Commonly known as: NORVASC  Take 5 mg by mouth once daily.     aspirin 325 MG Tab  Commonly known as: BUFFERIN  Take 1 tablet (325 mg total) by mouth 2 (two) times daily.     * C-TUB Misc  Generic drug: miscellaneous medical supply  PT eval and treat DJD Knees     * C-TUB Misc  Generic drug: miscellaneous medical supply  Dispense rolling walker, shower chair, bedside commode     * C-TUB Misc  Generic drug: miscellaneous medical supply  PT eval and treat.  Post op L HUMBERTO protocol.   WBAT limit on post op limb.     carvediloL 12.5 MG tablet  Commonly known as: COREG  Take 12.5 mg by mouth.     cefTRIAXone 10 gram injection  Commonly known as: ROCEPHIN     clindamycin 150 MG capsule  Commonly known as: CLEOCIN     CREON 24,000-76,000 -120,000 unit capsule  Generic drug: lipase-protease-amylase 24,000-76,000-120,000 units  Take 2 capsules by mouth 3 (three) times daily with meals.     diphenhydrAMINE 25 mg  capsule  Commonly known as: BENADRYL  Take 25 mg by mouth every 6 (six) hours as needed for Itching.     ergocalciferol 50,000 unit Cap  Commonly known as: ERGOCALCIFEROL  Take 50,000 Units by mouth.     EScitalopram oxalate 20 MG tablet  Commonly known as: LEXAPRO  Take 20 mg by mouth once daily.     esomeprazole 20 MG capsule  Commonly known as: NEXIUM  Take 20 mg by mouth before breakfast.     * furosemide 40 MG tablet  Commonly known as: LASIX  Take 40 mg by mouth 2 (two) times daily.     * furosemide 20 MG tablet  Commonly known as: LASIX     losartan-hydrochlorothiazide 100-12.5 mg 100-12.5 mg Tab  Commonly known as: HYZAAR  Take 1 tablet by mouth once daily.     magnesium 250 mg Tab  Take 1 tablet by mouth Daily.     metoprolol tartrate 100 MG tablet  Commonly known as: LOPRESSOR  Take 100 mg by mouth 2 (two) times daily.     omeprazole 40 MG capsule  Commonly known as: PRILOSEC     ondansetron 4 MG Tbdl  Commonly known as: ZOFRAN-ODT     pantoprazole 40 MG tablet  Commonly known as: PROTONIX  Take 40 mg by mouth once daily.     potassium citrate 99 mg Cap  Take 1 tablet by mouth Daily.     predniSONE 20 MG tablet  Commonly known as: DELTASONE     sacubitriL-valsartan 24-26 mg per tablet  Commonly known as: ENTRESTO  Take 1 tablet by mouth.     sertraline 100 MG tablet  Commonly known as: ZOLOFT  Take 100 mg by mouth 2 (two) times a day.     traMADoL 50 mg tablet  Commonly known as: ULTRAM     traZODone 50 MG tablet  Commonly known as: DESYREL  Take 50 mg by mouth every evening.     vitamin D 1000 units Tab  Commonly known as: VITAMIN D3  Take 1,000 Units by mouth once daily.         * This list has 5 medication(s) that are the same as other medications prescribed for you. Read the directions carefully, and ask your doctor or other care provider to review them with you.            STOP taking these medications    baclofen 10 MG tablet  Commonly known as: LIORESAL     cyclobenzaprine 10 MG tablet  Commonly  known as: FLEXERIL          Time spent on the discharge of patient:  minutes    Ab Das MD  General Surgery  [facility] - 8th Floor Med Surg

## 2023-07-29 NOTE — NURSING
Patient and  educated on caring for wounds, emptying and tracking FANNY drainage, signs and symptoms of infection. Performed dressing change with the patient. Given paperwork to document drainage.      Transport called to wheel patient to front lobby to meet .

## 2023-07-29 NOTE — PROGRESS NOTES
No acute events or complaints.   Tole diet  Afebrile, vital signs stable  Urine output adequate  No apparent distress  Regular rate and rhythm, clear to auscultation bilaterally  Abdomen is soft, nondistended, incisions are clean dry and intact  Extremities without edema, SCDs in place     Labs reviewed.     H/H noted, dilutional, as no signs of bleeding clinically and drain is serous  WBC as expected with splenectomy  DC home today with drain.

## 2023-08-02 ENCOUNTER — PATIENT OUTREACH (OUTPATIENT)
Dept: ADMINISTRATIVE | Facility: CLINIC | Age: 58
End: 2023-08-02
Payer: MEDICARE

## 2023-08-02 NOTE — PROGRESS NOTES
C3 nurse spoke with Marci Craft  for a TCC post hospital discharge follow up call. The patient has a scheduled HOSFU appointment with ROMINA Israel  on 08/08/2023 @ 1015 am.  Appointment with Dr. Brown on 08/24/2023 @ 930 am.

## 2023-08-04 LAB — PSYCHE PATHOLOGY RESULT: NORMAL

## 2023-08-08 ENCOUNTER — OFFICE VISIT (OUTPATIENT)
Dept: SURGICAL ONCOLOGY | Facility: CLINIC | Age: 58
End: 2023-08-08
Payer: MEDICARE

## 2023-08-08 DIAGNOSIS — R06.02 SHORTNESS OF BREATH: Primary | ICD-10-CM

## 2023-08-08 DIAGNOSIS — Z98.890 POST-OPERATIVE STATE: ICD-10-CM

## 2023-08-08 PROCEDURE — 99024 POSTOP FOLLOW-UP VISIT: CPT | Mod: S$GLB,,, | Performed by: NURSE PRACTITIONER

## 2023-08-08 PROCEDURE — 99024 PR POST-OP FOLLOW-UP VISIT: ICD-10-PCS | Mod: S$GLB,,, | Performed by: NURSE PRACTITIONER

## 2023-08-08 PROCEDURE — 99999 PR PBB SHADOW E&M-EST. PATIENT-LVL I: ICD-10-PCS | Mod: PBBFAC,,, | Performed by: NURSE PRACTITIONER

## 2023-08-08 PROCEDURE — 99999 PR PBB SHADOW E&M-EST. PATIENT-LVL I: CPT | Mod: PBBFAC,,, | Performed by: NURSE PRACTITIONER

## 2023-08-08 NOTE — PROGRESS NOTES
POST OP LAP/ROSIBEL DISTAL PANC/SPLEENECTOMY  DX NEUROENDOCRINE TUMOR PANCREAS    PT APPEARS TO BE DOING WELL  REPORTS SOME SOB WITH EXERTION  HX CHF  DENIES CP  NO FEVER OR CHILLS  EATING WELL  HAVING BMS    ABD SOFT  INCISIONS HEALING WELL  DRAIN APPEARS A LITTLE MILKY    PATH: 8.5CM WELL DIFF NEUROENDOCRINE TUMOR, MARGINS CLEAR, 1/33 NODES POSITIVE  DR COY REVIEWS PATH AND VISITS WITH PT TO DISCUSS  QUESTIONS ANSWERED    DRAIN TO REMAIN FOR NOW  WILL SEND PT FOR CT CHEST PE PROTOCOL  WILL MAKE REFERRAL TO MED ONC IN [place]    PLAN  FOLLOW CT RESULTS  RTC 2 WEEKS

## 2023-08-08 NOTE — PHYSICIAN QUERY
PT Name: Marci Craft  MR #: [id number]    DOCUMENTATION CLARIFICATION     CDS/: Adelita Mcclure RN        Contact information: [email or web address]  This form is a permanent document in the medical record.     Query Date: August 8, 2023    By submitting this query, we are merely seeking further clarification of documentation.  Please utilize your independent clinical judgment when addressing the question(s) below.    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       B) SURGICAL EXCISION MARGIN CLEAR (TUMOR 2.4 CM FROM PANCREATIC PARENCHYMAL   SURGICAL MARGIN).       C) MULTIFOCAL LYMPHOVASCULAR TUMOR INVASION.       D) ONE OF THIRTY-THREE REGIONAL LYMPH NODES POSITIVE FOR METASTATIC TUMOR   (1/33).       E) 206 GRAM SPLEEN, NEGATIVE FOR TUMOR INVOLVEMENT   7/27 Path result       Please clarify the pathology findings.  [ X ] Pathology findings noted above are ruled in/confirmed as diagnoses   [  ] Pathology findings noted above are not confirmed as diagnoses   [  ] Other diagnosis (please specify): ___________     Please document in your progress notes daily for the duration of treatment until resolved and include in your discharge summary.    Form No. [id number]

## 2023-08-08 NOTE — PROGRESS NOTES
RESULTS CALL    CT CHEST PE PROTOCOL REPORT REVIEWED  NO EVIDENCE OF PE  NO ACUTE PATHOLOGY  DR COY AWARE    SPOKE WITH PT TO GIVE RESULTS  SHE IS VERY APPRECIATIVE  KNOWS TO CALL WITH ANY PROBLEMS    RTC AS PLANNED

## 2023-08-09 ENCOUNTER — DOCUMENTATION ONLY (OUTPATIENT)
Dept: SURGICAL ONCOLOGY | Facility: CLINIC | Age: 58
End: 2023-08-09

## 2023-08-09 NOTE — PROGRESS NOTES
Faxed referral to Dr. Marr in [place]. Reminder set to f/u with their office next week and verify that referral was received and see when patient will be scheduled. cpl

## 2023-08-22 ENCOUNTER — OFFICE VISIT (OUTPATIENT)
Dept: SURGICAL ONCOLOGY | Facility: CLINIC | Age: 58
End: 2023-08-22
Payer: MEDICARE

## 2023-08-22 DIAGNOSIS — K86.89 PANCREATIC MASS: Primary | ICD-10-CM

## 2023-08-22 PROCEDURE — 99024 PR POST-OP FOLLOW-UP VISIT: ICD-10-PCS | Mod: S$GLB,,, | Performed by: NURSE PRACTITIONER

## 2023-08-22 PROCEDURE — 99999 PR PBB SHADOW E&M-EST. PATIENT-LVL I: CPT | Mod: PBBFAC,,, | Performed by: NURSE PRACTITIONER

## 2023-08-22 PROCEDURE — 99999 PR PBB SHADOW E&M-EST. PATIENT-LVL I: ICD-10-PCS | Mod: PBBFAC,,, | Performed by: NURSE PRACTITIONER

## 2023-08-22 PROCEDURE — 99024 POSTOP FOLLOW-UP VISIT: CPT | Mod: S$GLB,,, | Performed by: NURSE PRACTITIONER

## 2023-08-22 NOTE — PROGRESS NOTES
OFFICE FOLLOW UP  S/P LAP/ROSIBEL DISTAL PANC/SPLEENECTOMY DX NEUROENDOCRINE TUMOR    PT LOOKS GREAT AND TELLS ME SHE IS DOING WELL  EATING  HAVING BMS  NO FEVER OR CHILLS  NO COMPLAINTS OF PAIN  REPORTS MINIMAL OUTPUT IN DRAIN    ABD SOFT  INCISIONS HEALED  DRAIN EMPTY, LAST EMPTIED YESTERDAY    DISCUSSED WITH DR COY; PTS CASE WAS PRESENTED AT TUMOR BOARD AND RECS NO REFERRAL TO MED ONC NECESSARY; THIS IS DISCUSSED WITH PT AND HER FOLLOW UP WITH BE HERE    DRAIN REMOVED, PT TOLERATED WELL    PLAN  CT C/A/P 3 MONTHS WITH RETURN HERE  SHE IS SO SWEET AND APPRECIATIVE, PLEASURE TO CARE FOR HER

## 2023-11-28 ENCOUNTER — OFFICE VISIT (OUTPATIENT)
Dept: SURGICAL ONCOLOGY | Facility: CLINIC | Age: 58
End: 2023-11-28
Payer: MEDICARE

## 2023-11-28 VITALS
SYSTOLIC BLOOD PRESSURE: 111 MMHG | HEIGHT: 68 IN | BODY MASS INDEX: 30.71 KG/M2 | DIASTOLIC BLOOD PRESSURE: 69 MMHG | WEIGHT: 202.63 LBS

## 2023-11-28 DIAGNOSIS — D3A.8 PRIMARY PANCREATIC NEUROENDOCRINE TUMOR: Primary | ICD-10-CM

## 2023-11-28 PROCEDURE — 3008F BODY MASS INDEX DOCD: CPT | Mod: CPTII,S$GLB,, | Performed by: SURGERY

## 2023-11-28 PROCEDURE — 3078F DIAST BP <80 MM HG: CPT | Mod: CPTII,S$GLB,, | Performed by: SURGERY

## 2023-11-28 PROCEDURE — 3008F PR BODY MASS INDEX (BMI) DOCUMENTED: ICD-10-PCS | Mod: CPTII,S$GLB,, | Performed by: SURGERY

## 2023-11-28 PROCEDURE — 3074F PR MOST RECENT SYSTOLIC BLOOD PRESSURE < 130 MM HG: ICD-10-PCS | Mod: CPTII,S$GLB,, | Performed by: SURGERY

## 2023-11-28 PROCEDURE — 99214 PR OFFICE/OUTPT VISIT, EST, LEVL IV, 30-39 MIN: ICD-10-PCS | Mod: S$GLB,,, | Performed by: SURGERY

## 2023-11-28 PROCEDURE — 1159F MED LIST DOCD IN RCRD: CPT | Mod: CPTII,S$GLB,, | Performed by: SURGERY

## 2023-11-28 PROCEDURE — 3074F SYST BP LT 130 MM HG: CPT | Mod: CPTII,S$GLB,, | Performed by: SURGERY

## 2023-11-28 PROCEDURE — 99214 OFFICE O/P EST MOD 30 MIN: CPT | Mod: S$GLB,,, | Performed by: SURGERY

## 2023-11-28 PROCEDURE — 3078F PR MOST RECENT DIASTOLIC BLOOD PRESSURE < 80 MM HG: ICD-10-PCS | Mod: CPTII,S$GLB,, | Performed by: SURGERY

## 2023-11-28 PROCEDURE — 1159F PR MEDICATION LIST DOCUMENTED IN MEDICAL RECORD: ICD-10-PCS | Mod: CPTII,S$GLB,, | Performed by: SURGERY

## 2023-11-28 PROCEDURE — 99999 PR PBB SHADOW E&M-EST. PATIENT-LVL V: CPT | Mod: PBBFAC,,, | Performed by: SURGERY

## 2023-11-28 PROCEDURE — 99999 PR PBB SHADOW E&M-EST. PATIENT-LVL V: ICD-10-PCS | Mod: PBBFAC,,, | Performed by: SURGERY

## 2023-11-28 NOTE — PROGRESS NOTES
History & Physical    CHIEF COMPLAINT:  PANCREATIC MASS     History of Present Illness:  58 year-old-female referred by Dr. Zachary Diaz.  In December 2022 patient presented to the ER with complaints of abdominal pain.  CT abd/pel showed a mass involving the distal body and tail of the pancreas with soft tissue stranding extending into the peripancreatic tissue which may represent superimposed pancreatitis.  EUS was performed, a parenchymal mass was noted in the pancreas; biopsy was not performed.  MRI abdomen in April 2023 showed a 6.7 x 3.8 x 3.8 cm heterogeneously enhancing mass within the pancreas, slightly decreased from previous imaging.  EUS performed in June 2023, parenchymal mass again noted in the pancreas with ductal dilation in the pancreas.  Biopsy of the mass revealed well-differentiated neuroendocrine tumor, grade 2 of 3.  The patient denies abdominal pain, nausea, vomiting, anorexia, weight loss, diarrhea or flushing.    In July 2023 she underwent laparoscopic/robotic distal pancreatectomy and splenectomy, final pathology revealed well-differentiated neuroendocrine tumor of the tail of the pancreas, 8.5 cm, grade 1, surgical margins clear, 1/30 regional nodes positive for metastatic disease.    She returns today for routine surveillance.  She denies abdominal pain, nausea, vomiting, anorexia or weight loss.  She denies diarrhea or flushing.  Recent surveillance CT scans were reviewed, personally interpreted the images and report there was no evidence of recurrent or metastatic disease.    Greater than 30 minutes was required for complete chart review, imaging review including interpretation of imaging, coordination with referring/other physicians, patient counseling regarding diagnosis/treatment plan, answering questions, medical decision making, and documentation.      Review of patient's allergies indicates:   Allergen Reactions    Nsaids (non-steroidal anti-inflammatory drug) Hives       Current  Outpatient Medications   Medication Sig Dispense Refill    acetaminophen (TYLENOL) 650 MG TbSR Take 650 mg by mouth as needed.      amLODIPine (NORVASC) 5 MG tablet Take 5 mg by mouth once daily.      carvedilol (COREG) 12.5 MG tablet Take 12.5 mg by mouth.      cefTRIAXone (ROCEPHIN) 10 gram injection       clindamycin (CLEOCIN) 150 MG capsule       diphenhydrAMINE (BENADRYL) 25 mg capsule Take 25 mg by mouth every 6 (six) hours as needed for Itching.      ergocalciferol (ERGOCALCIFEROL) 50,000 unit Cap Take 50,000 Units by mouth.      escitalopram oxalate (LEXAPRO) 20 MG tablet Take 20 mg by mouth once daily.      esomeprazole (NEXIUM) 20 MG capsule Take 20 mg by mouth before breakfast.      furosemide (LASIX) 20 MG tablet       furosemide (LASIX) 40 MG tablet Take 40 mg by mouth 2 (two) times daily.      HYDROcodone-acetaminophen (NORCO) 5-325 mg per tablet SMARTSI Tablet(s) By Mouth Every 4 Hours      HYDROcodone-acetaminophen (NORCO) 7.5-325 mg per tablet Take 1 tablet by mouth every 6 (six) hours as needed for Pain. 20 tablet 0    HYDROcodone-acetaminophen (NORCO) 7.5-325 mg per tablet Take 1 tablet by mouth every 6 (six) hours as needed for Pain. 20 tablet 0    lipase-protease-amylase 24,000-76,000-120,000 units (CREON) 24,000-76,000 -120,000 unit capsule Take 2 capsules by mouth 3 (three) times daily with meals.      losartan-hydrochlorothiazide 100-12.5 mg (HYZAAR) 100-12.5 mg Tab Take 1 tablet by mouth once daily.      magnesium 250 mg Tab Take 1 tablet by mouth Daily.      metoprolol tartrate (LOPRESSOR) 100 MG tablet Take 100 mg by mouth 2 (two) times daily.      miscellaneous medical supply (C-TUB) [facility] eval and treat DJD Knees      miscellaneous medical supply (C-TUB) Misc Dispense rolling walker, shower chair, bedside commode      miscellaneous medical supply (C-TUB) [facility] eval and treat.  Post op L HUMBERTO protocol.   WBAT limit on post op limb.      omeprazole (PRILOSEC) 40 MG capsule        ondansetron (ZOFRAN-ODT) 4 MG TbDL       pantoprazole (PROTONIX) 40 MG tablet Take 40 mg by mouth once daily.      potassium citrate 99 mg Cap Take 1 tablet by mouth Daily.      predniSONE (DELTASONE) 20 MG tablet       sacubitril-valsartan (ENTRESTO) 24-26 mg per tablet Take 1 tablet by mouth.      sertraline (ZOLOFT) 100 MG tablet Take 100 mg by mouth 2 (two) times a day.      traMADoL (ULTRAM) 50 mg tablet       traZODone (DESYREL) 50 MG tablet Take 50 mg by mouth every evening.      vitamin D (VITAMIN D3) 1000 units Tab Take 1,000 Units by mouth once daily.       Current Facility-Administered Medications   Medication Dose Route Frequency Provider Last Rate Last Admin    sodium chloride 0.9% flush 10 mL  10 mL Intravenous PRN Jaime Augustine MD           Past Medical History:   Diagnosis Date    Abdominal pain     Anxiety     Arthritis     CHF (congestive heart failure)     Depression     Diarrhea     Gout     Hypertension     Indigestion     Nausea & vomiting     Obesity     Pacemaker 10/15/2018    Paroxysmal A-fib     Peptic ulcer disease     Primary pancreatic neuroendocrine tumor     SOB (shortness of breath) on exertion      Past Surgical History:   Procedure Laterality Date    CARDIAC SURGERY      CARDIAC VALVE REPLACEMENT       SECTION      CHOLECYSTECTOMY      COLONOSCOPY W/ POLYPECTOMY      EYE SURGERY      HIP ARTHROPLASTY Right 2020    Procedure: ARTHROPLASTY, HIP;  Surgeon: Minor Robledo MD;  Location: [facility] MAIN OR;  Service: Orthopedics;  Laterality: Right;    INJECTION OF JOINT Right 2020    Procedure: Injection, Joint, Shoulder, Hip, Or Knee;  Surgeon: Minor Robledo MD;  Location: [facility] MAIN OR;  Service: Orthopedics;  Laterality: Right;    INSERTION OF PACEMAKER  10/09/2018    defibrillator    IRRIGATION AND DEBRIDEMENT OF LOWER EXTREMITY Right 10/27/2020    Procedure: IRRIGATION AND DEBRIDEMENT RIGHT HIP;  Surgeon: Minor Robledo MD;  Location: [facility] MAIN OR;  Service:  "Orthopedics;  Laterality: Right;    MITRAL VALVE REPLACEMENT  03/21/2017    REVISION TOTAL HIP ARTHROPLASTY Left     ROBOT-ASSISTED PANCREATECTOMY N/A 7/27/2023    Procedure: ROBOTIC PANCREATECTOMY;  Surgeon: Ab Das MD;  Location: [facility] OR;  Service: General;  Laterality: N/A;  DISTAL PANC; ULTRASOUND GUIDANCE // AND ROBOT SPLENECTOMY    TUBAL LIGATION       Family History   Problem Relation Age of Onset    Depression Mother     Hyperlipidemia Maternal Aunt     Hypertension Maternal Aunt     Breast cancer Maternal Aunt      Social History     Tobacco Use    Smoking status: Never    Smokeless tobacco: Never   Substance Use Topics    Alcohol use: Yes     Alcohol/week: 1.0 standard drink of alcohol     Types: 1 Glasses of wine per week     Comment: occassions    Drug use: Never        Review of Systems:  Review of Systems   Constitutional:  Negative for appetite change, chills, diaphoresis and fever.   HENT:  Negative for congestion, drooling, ear discharge, ear pain and hearing loss.    Eyes:  Negative for discharge.   Respiratory:  Negative for apnea, cough, choking, chest tightness, shortness of breath and stridor.    Cardiovascular:  Negative for chest pain, palpitations and leg swelling.   Endocrine: Negative for cold intolerance and heat intolerance.   Genitourinary:  Negative for difficulty urinating, dyspareunia, dysuria and hematuria.   Musculoskeletal:  Negative for arthralgias, gait problem and joint swelling.   Skin:  Negative for color change and rash.   Neurological:  Negative for dizziness, tremors, seizures, syncope, facial asymmetry, speech difficulty, light-headedness, numbness and headaches.   Psychiatric/Behavioral:  Negative for agitation and confusion.        OBJECTIVE:     Vital Signs (Most Recent)  Pulse: (P) 76 (11/28/23 1100)  BP: 111/69 (11/28/23 1100)  5' 8" (1.727 m)  91.9 kg (202 lb 9.6 oz)     Physical Exam:  Physical Exam  Constitutional:       General: She is not in acute " distress.     Appearance: Normal appearance. She is not toxic-appearing.   HENT:      Head: Normocephalic and atraumatic.      Right Ear: External ear normal.      Left Ear: External ear normal.      Mouth/Throat:      Mouth: Mucous membranes are moist.   Eyes:      General: No scleral icterus.     Conjunctiva/sclera: Conjunctivae normal.      Pupils: Pupils are equal, round, and reactive to light.   Cardiovascular:      Rate and Rhythm: Normal rate and regular rhythm.      Pulses: Normal pulses.   Pulmonary:      Effort: Pulmonary effort is normal. No respiratory distress.      Breath sounds: Normal breath sounds. No stridor. No wheezing or rhonchi.   Musculoskeletal:         General: No swelling or tenderness. Normal range of motion.      Cervical back: Normal range of motion and neck supple.      Right lower leg: No edema.      Left lower leg: No edema.   Skin:     General: Skin is warm.      Capillary Refill: Capillary refill takes less than 2 seconds.      Coloration: Skin is not jaundiced or pale.      Findings: No erythema or rash.   Neurological:      General: No focal deficit present.      Mental Status: She is alert and oriented to person, place, and time.      Gait: Gait normal.   Psychiatric:         Mood and Affect: Mood normal.         Behavior: Behavior normal.         Judgment: Judgment normal.           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  Status post laparoscopic/robotic distal pancreatectomy/splenectomy July 2023..    No evidence of disease    Return to clinic in 6 months with repeat CT scan of the abdomen and pelvis with pancreas protocol, CT scan of the chest without contrast      Ab Das MD  Surgical Oncology  Complex General, Gastrointestinal and Hepatobiliary Surgery

## 2024-10-22 ENCOUNTER — DOCUMENTATION ONLY (OUTPATIENT)
Dept: SURGICAL ONCOLOGY | Facility: CLINIC | Age: 59
End: 2024-10-22
Payer: MEDICARE

## 2024-10-22 NOTE — PROGRESS NOTES
Authorized CT's with [employer] insurance.     CT Chest WO  AUTH #: [id number]   VALID: 10/22/2024-12/21/2024    CT Abdomen Pelvis WWO  AUTH #: [id number]   VALID: 10/22/24-12/21/24    Patient was scheduled at [facility] for both CT's on 11/13 @09:00am & 0930am. She is also scheduled for a same day appointment to review results- 11/13 @1:00pm. cpl

## 2024-11-13 ENCOUNTER — OFFICE VISIT (OUTPATIENT)
Dept: SURGICAL ONCOLOGY | Facility: CLINIC | Age: 59
End: 2024-11-13
Payer: MEDICARE

## 2024-11-13 VITALS
TEMPERATURE: 98 F | OXYGEN SATURATION: 98 % | SYSTOLIC BLOOD PRESSURE: 101 MMHG | WEIGHT: 174.38 LBS | DIASTOLIC BLOOD PRESSURE: 78 MMHG | HEART RATE: 80 BPM | HEIGHT: 67 IN | BODY MASS INDEX: 27.37 KG/M2

## 2024-11-13 DIAGNOSIS — D3A.8 PRIMARY PANCREATIC NEUROENDOCRINE TUMOR: Primary | ICD-10-CM

## 2024-11-13 DIAGNOSIS — K86.89 PANCREATIC MASS: ICD-10-CM

## 2024-11-13 PROCEDURE — 3074F SYST BP LT 130 MM HG: CPT | Mod: CPTII,S$GLB,, | Performed by: SURGERY

## 2024-11-13 PROCEDURE — G2211 COMPLEX E/M VISIT ADD ON: HCPCS | Mod: S$GLB,,, | Performed by: SURGERY

## 2024-11-13 PROCEDURE — 3008F BODY MASS INDEX DOCD: CPT | Mod: CPTII,S$GLB,, | Performed by: SURGERY

## 2024-11-13 PROCEDURE — 99999 PR PBB SHADOW E&M-EST. PATIENT-LVL V: CPT | Mod: PBBFAC,,, | Performed by: SURGERY

## 2024-11-13 PROCEDURE — 99214 OFFICE O/P EST MOD 30 MIN: CPT | Mod: S$GLB,,, | Performed by: SURGERY

## 2024-11-13 PROCEDURE — 3078F DIAST BP <80 MM HG: CPT | Mod: CPTII,S$GLB,, | Performed by: SURGERY

## 2024-11-13 PROCEDURE — 1159F MED LIST DOCD IN RCRD: CPT | Mod: CPTII,S$GLB,, | Performed by: SURGERY

## 2024-11-13 NOTE — PROGRESS NOTES
History & Physical    CHIEF COMPLAINT:  PANCREATIC MASS     History of Present Illness:  59 year-old-female referred by Dr. Zachary Diaz.  In December 2022 patient presented to the ER with complaints of abdominal pain.  CT abd/pel showed a mass involving the distal body and tail of the pancreas with soft tissue stranding extending into the peripancreatic tissue which may represent superimposed pancreatitis.  EUS was performed, a parenchymal mass was noted in the pancreas; biopsy was not performed.  MRI abdomen in April 2023 showed a 6.7 x 3.8 x 3.8 cm heterogeneously enhancing mass within the pancreas, slightly decreased from previous imaging.  EUS performed in June 2023, parenchymal mass again noted in the pancreas with ductal dilation in the pancreas.  Biopsy of the mass revealed well-differentiated neuroendocrine tumor, grade 2 of 3.  The patient denies abdominal pain, nausea, vomiting, anorexia, weight loss, diarrhea or flushing.    In July 2023 she underwent laparoscopic/robotic distal pancreatectomy and splenectomy, final pathology revealed well-differentiated neuroendocrine tumor of the tail of the pancreas, 8.5 cm, grade 1, surgical margins clear, 1/30 regional nodes positive for metastatic disease.    She returns today for routine surveillance.  She denies abdominal pain, nausea, vomiting, anorexia or weight loss.  She denies diarrhea or flushing.  Recent surveillance CT scans were reviewed, personally interpreted the images and report there was no evidence of recurrent or metastatic disease.    32 minutes was required for complete chart review, imaging review including interpretation of imaging, coordination with referring/other physicians, patient counseling regarding diagnosis/treatment plan, answering questions, medical decision making, and documentation.    Visit today included increased complexity associated with the care of the episodic problem pancreatic neuroendocrine tumor addressed and managing  the longitudinal care of the patient due to the serious and/or complex managed problem(s) .    Review of patient's allergies indicates:   Allergen Reactions    Nsaids (non-steroidal anti-inflammatory drug) Hives       Current Outpatient Medications   Medication Sig Dispense Refill    acetaminophen (TYLENOL) 650 MG TbSR Take 650 mg by mouth as needed.      amLODIPine (NORVASC) 5 MG tablet Take 5 mg by mouth once daily.      carvedilol (COREG) 12.5 MG tablet Take 12.5 mg by mouth.      cefTRIAXone (ROCEPHIN) 10 gram injection       clindamycin (CLEOCIN) 150 MG capsule       diphenhydrAMINE (BENADRYL) 25 mg capsule Take 25 mg by mouth every 6 (six) hours as needed for Itching.      ergocalciferol (ERGOCALCIFEROL) 50,000 unit Cap Take 50,000 Units by mouth.      escitalopram oxalate (LEXAPRO) 20 MG tablet Take 20 mg by mouth once daily.      esomeprazole (NEXIUM) 20 MG capsule Take 20 mg by mouth before breakfast.      furosemide (LASIX) 20 MG tablet       furosemide (LASIX) 40 MG tablet Take 40 mg by mouth 2 (two) times daily.      HYDROcodone-acetaminophen (NORCO) 5-325 mg per tablet SMARTSI Tablet(s) By Mouth Every 4 Hours      HYDROcodone-acetaminophen (NORCO) 7.5-325 mg per tablet Take 1 tablet by mouth every 6 (six) hours as needed for Pain. 20 tablet 0    HYDROcodone-acetaminophen (NORCO) 7.5-325 mg per tablet Take 1 tablet by mouth every 6 (six) hours as needed for Pain. 20 tablet 0    lipase-protease-amylase 24,000-76,000-120,000 units (CREON) 24,000-76,000 -120,000 unit capsule Take 2 capsules by mouth 3 (three) times daily with meals.      losartan-hydrochlorothiazide 100-12.5 mg (HYZAAR) 100-12.5 mg Tab Take 1 tablet by mouth once daily.      magnesium 250 mg Tab Take 1 tablet by mouth Daily.      metoprolol tartrate (LOPRESSOR) 100 MG tablet Take 100 mg by mouth 2 (two) times daily.      miscellaneous medical supply (C-TUB) Mis PT eval and treat DJD Knees      miscellaneous medical supply (C-TUB) Misc  Dispense rolling walker, shower chair, bedside commode      miscellaneous medical supply (C-TUB) Misc PT eval and treat.  Post op L HUMBERTO protocol.   WBAT limit on post op limb.      omeprazole (PRILOSEC) 40 MG capsule       ondansetron (ZOFRAN-ODT) 4 MG TbDL       pantoprazole (PROTONIX) 40 MG tablet Take 40 mg by mouth once daily.      potassium citrate 99 mg Cap Take 1 tablet by mouth Daily.      predniSONE (DELTASONE) 20 MG tablet       sacubitril-valsartan (ENTRESTO) 24-26 mg per tablet Take 1 tablet by mouth.      sertraline (ZOLOFT) 100 MG tablet Take 100 mg by mouth 2 (two) times a day.      traMADoL (ULTRAM) 50 mg tablet       traZODone (DESYREL) 50 MG tablet Take 50 mg by mouth every evening.      vitamin D (VITAMIN D3) 1000 units Tab Take 1,000 Units by mouth once daily.       Current Facility-Administered Medications   Medication Dose Route Frequency Provider Last Rate Last Admin    sodium chloride 0.9% flush 10 mL  10 mL Intravenous PRN Jaime Augustine MD           Past Medical History:   Diagnosis Date    Abdominal pain     Anxiety     Arthritis     CHF (congestive heart failure)     Depression     Diarrhea     Gout     Hypertension     Indigestion     Nausea & vomiting     Obesity     Pacemaker 10/15/2018    Paroxysmal A-fib     Peptic ulcer disease     Primary pancreatic neuroendocrine tumor     SOB (shortness of breath) on exertion      Past Surgical History:   Procedure Laterality Date    CARDIAC SURGERY      CARDIAC VALVE REPLACEMENT       SECTION      CHOLECYSTECTOMY      COLONOSCOPY W/ POLYPECTOMY      EYE SURGERY      HIP ARTHROPLASTY Right 2020    Procedure: ARTHROPLASTY, HIP;  Surgeon: Minor Robledo MD;  Location: [facility] MAIN OR;  Service: Orthopedics;  Laterality: Right;    INJECTION OF JOINT Right 2020    Procedure: Injection, Joint, Shoulder, Hip, Or Knee;  Surgeon: Minor Robledo MD;  Location: [facility] MAIN OR;  Service: Orthopedics;  Laterality: Right;    INSERTION OF  PACEMAKER  10/09/2018    defibrillator    IRRIGATION AND DEBRIDEMENT OF LOWER EXTREMITY Right 10/27/2020    Procedure: IRRIGATION AND DEBRIDEMENT RIGHT HIP;  Surgeon: Minor Robledo MD;  Location: [facility] MAIN OR;  Service: Orthopedics;  Laterality: Right;    MITRAL VALVE REPLACEMENT  03/21/2017    REVISION TOTAL HIP ARTHROPLASTY Left     ROBOT-ASSISTED PANCREATECTOMY N/A 7/27/2023    Procedure: ROBOTIC PANCREATECTOMY;  Surgeon: Ab Das MD;  Location: [facility] OR;  Service: General;  Laterality: N/A;  DISTAL PANC; ULTRASOUND GUIDANCE // AND ROBOT SPLENECTOMY    TUBAL LIGATION       Family History   Problem Relation Name Age of Onset    Depression Mother      Hyperlipidemia Maternal Aunt      Hypertension Maternal Aunt      Breast cancer Maternal Aunt       Social History     Tobacco Use    Smoking status: Never    Smokeless tobacco: Never   Substance Use Topics    Alcohol use: Yes     Alcohol/week: 1.0 standard drink of alcohol     Types: 1 Glasses of wine per week     Comment: occassions    Drug use: Never        Review of Systems:  Review of Systems   Constitutional:  Negative for appetite change, chills, diaphoresis and fever.   HENT:  Negative for congestion, drooling, ear discharge, ear pain and hearing loss.    Eyes:  Negative for discharge.   Respiratory:  Negative for apnea, cough, choking, chest tightness, shortness of breath and stridor.    Cardiovascular:  Negative for chest pain, palpitations and leg swelling.   Endocrine: Negative for cold intolerance and heat intolerance.   Genitourinary:  Negative for difficulty urinating, dyspareunia, dysuria and hematuria.   Musculoskeletal:  Negative for arthralgias, gait problem and joint swelling.   Skin:  Negative for color change and rash.   Neurological:  Negative for dizziness, tremors, seizures, syncope, facial asymmetry, speech difficulty, light-headedness, numbness and headaches.   Psychiatric/Behavioral:  Negative for agitation and confusion.   "      OBJECTIVE:     Vital Signs (Most Recent)  Temp: 98.4 °F (36.9 °C) (11/13/24 1305)  Pulse: 80 (11/13/24 1305)  BP: 101/78 (11/13/24 1305)  SpO2: 98 % (11/13/24 1305)  5' 7" (1.702 m)  79.1 kg (174 lb 6.4 oz)     Physical Exam:  Physical Exam  Constitutional:       General: She is not in acute distress.     Appearance: Normal appearance. She is not toxic-appearing.   HENT:      Head: Normocephalic and atraumatic.      Right Ear: External ear normal.      Left Ear: External ear normal.      Mouth/Throat:      Mouth: Mucous membranes are moist.   Eyes:      General: No scleral icterus.     Conjunctiva/sclera: Conjunctivae normal.      Pupils: Pupils are equal, round, and reactive to light.   Cardiovascular:      Rate and Rhythm: Normal rate and regular rhythm.      Pulses: Normal pulses.   Pulmonary:      Effort: Pulmonary effort is normal. No respiratory distress.      Breath sounds: Normal breath sounds. No stridor. No wheezing or rhonchi.   Musculoskeletal:         General: No swelling or tenderness. Normal range of motion.      Cervical back: Normal range of motion and neck supple.      Right lower leg: No edema.      Left lower leg: No edema.   Skin:     General: Skin is warm.      Capillary Refill: Capillary refill takes less than 2 seconds.      Coloration: Skin is not jaundiced or pale.      Findings: No erythema or rash.   Neurological:      General: No focal deficit present.      Mental Status: She is alert and oriented to person, place, and time.      Gait: Gait normal.   Psychiatric:         Mood and Affect: Mood normal.         Behavior: Behavior normal.         Judgment: Judgment normal.           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  Status post laparoscopic/robotic distal pancreatectomy/splenectomy July 2023..    No evidence of disease    Return to clinic in 12 months with repeat CT scan of the abdomen and pelvis with " pancreas protocol, CT scan of the chest without contrast      Ab Das MD  Surgical Oncology  Complex General, Gastrointestinal and Hepatobiliary Surgery

## 2025-04-25 ENCOUNTER — TELEPHONE (OUTPATIENT)
Dept: HEMATOLOGY/ONCOLOGY | Facility: CLINIC | Age: 60
End: 2025-04-25
Payer: MEDICARE

## 2025-04-25 NOTE — TELEPHONE ENCOUNTER
Spoke to the patient regarding referral. Offered appointment week of 4/28/25. Patient declined. Appointment made per patient request. Appointment mailed with location per request. All question answered. Patient understands that if she is to become symptomatic she is to go to the ER. States understanding. TTRN

## 2025-05-09 ENCOUNTER — TELEPHONE (OUTPATIENT)
Dept: HEMATOLOGY/ONCOLOGY | Facility: CLINIC | Age: 60
End: 2025-05-09
Payer: MEDICARE

## 2025-05-21 ENCOUNTER — OFFICE VISIT (OUTPATIENT)
Dept: HEMATOLOGY/ONCOLOGY | Facility: CLINIC | Age: 60
End: 2025-05-21
Payer: COMMERCIAL

## 2025-05-21 VITALS
SYSTOLIC BLOOD PRESSURE: 90 MMHG | OXYGEN SATURATION: 99 % | RESPIRATION RATE: 16 BRPM | DIASTOLIC BLOOD PRESSURE: 63 MMHG | WEIGHT: 167.5 LBS | BODY MASS INDEX: 25.39 KG/M2 | HEIGHT: 68 IN | HEART RATE: 87 BPM

## 2025-05-21 DIAGNOSIS — D63.8 ANEMIA IN OTHER CHRONIC DISEASES CLASSIFIED ELSEWHERE: ICD-10-CM

## 2025-05-21 DIAGNOSIS — C7B.8 OTHER SECONDARY NEUROENDOCRINE TUMORS: Primary | ICD-10-CM

## 2025-05-21 DIAGNOSIS — D53.9 NUTRITIONAL ANEMIA, UNSPECIFIED: ICD-10-CM

## 2025-05-21 LAB
ABS NRBC COUNT: 0.3 X 10 3/UL (ref 0–0.01)
ABSOLUTE BASOPHIL: 0.3 X 10 3/UL (ref 0–0.22)
ABSOLUTE BASOPHIL: ABNORMAL
ABSOLUTE EOSINOPHIL: 0 X 10 3/UL (ref 0.04–0.54)
ABSOLUTE EOSINOPHIL: ABNORMAL
ABSOLUTE IMMATURE GRAN: ABNORMAL
ABSOLUTE LYMPHOCYTE: 2.51 X 10 3/UL (ref 0.86–4.75)
ABSOLUTE LYMPHOCYTE: ABNORMAL
ABSOLUTE MONOCYTE: 0.74 X 10 3/UL (ref 0.22–1.08)
ABSOLUTE MONOCYTE: ABNORMAL
B12: 851 PG/ML (ref 232–1245)
BANDS: ABNORMAL
BASOPHILS NFR BLD: 2 % (ref 0–1)
BASOPHILS NFR BLD: ABNORMAL %
BLAST CELL: ABNORMAL
CORTIS SERPL-MCNC: ABNORMAL UG/DL
EOSINOPHIL NFR BLD: 0 % (ref 1–7)
EOSINOPHIL NFR BLD: ABNORMAL %
FERRITIN: 198 NG/ML (ref 10–232)
FOLATE SERPL-MCNC: 5.5 NG/ML (ref 4.4–31)
HCT VFR BLD AUTO: 24.3 % (ref 37–47)
HGB BLD-MCNC: 8 G/DL (ref 12–16)
IMMATURE GRANULOCYTES: ABNORMAL
IRON BINDING CAPACITY: 266 UG/DL (ref 262–472)
IRON SERPL-MCNC: 24 UG/DL (ref 37–145)
LYMPHOCYTES NFR BLD: 17 % (ref 19–53)
LYMPHOCYTES NFR BLD: ABNORMAL %
MCH RBC QN AUTO: 28.6 PG (ref 27–32)
MCHC RBC AUTO-ENTMCNC: 32.9 G/DL (ref 32–36)
MCV RBC AUTO: 86.8 FL (ref 82–100)
METAMYELOCYTES # BLD MANUAL: ABNORMAL 10*3/UL
MONOCYTES NFR BLD: 5 % (ref 5–13)
MONOCYTES NFR BLD: ABNORMAL %
NEUTROPHILS # BLD AUTO: 11.24 X 10 3/UL (ref 2.32–6.7)
NEUTROPHILS # BLD AUTO: ABNORMAL 10*3/UL
NEUTROPHILS NFR BLD: ABNORMAL %
NUCLEATED RED BLOOD CELLS: 2 /100 WBC (ref 0–0.2)
OTHER: ABNORMAL
PLATELET # BLD AUTO: 831 X 10 3/UL (ref 135–400)
PROMYELOCYTES: ABNORMAL
RBC # BLD AUTO: 2.8 X 10 6/UL (ref 4.2–5.4)
RBC & PLT MORPHOLOGY: ABNORMAL
RDW-SD: 58.4 FL (ref 37–54)
REACTIVE LYMPHOCYTES: ABNORMAL
SEGMENTERS: 76 % (ref 34–71)
TOTAL GRANULOCYTES: 11.54 X 10 3/UL
UIBC SERPL-MCNC: 242 UG/DL (ref 112–306)
WBC # BLD: 14.79 X 10 3/UL (ref 4.3–10.8)

## 2025-05-21 PROCEDURE — 99205 OFFICE O/P NEW HI 60 MIN: CPT | Mod: S$PBB,,, | Performed by: INTERNAL MEDICINE

## 2025-05-21 PROCEDURE — 3074F SYST BP LT 130 MM HG: CPT | Mod: CPTII,,, | Performed by: INTERNAL MEDICINE

## 2025-05-21 PROCEDURE — G2211 COMPLEX E/M VISIT ADD ON: HCPCS | Mod: S$PBB,,, | Performed by: INTERNAL MEDICINE

## 2025-05-21 PROCEDURE — 3078F DIAST BP <80 MM HG: CPT | Mod: CPTII,,, | Performed by: INTERNAL MEDICINE

## 2025-05-21 PROCEDURE — 1159F MED LIST DOCD IN RCRD: CPT | Mod: CPTII,,, | Performed by: INTERNAL MEDICINE

## 2025-05-21 PROCEDURE — 3008F BODY MASS INDEX DOCD: CPT | Mod: CPTII,,, | Performed by: INTERNAL MEDICINE

## 2025-05-21 RX ORDER — ALLOPURINOL 300 MG/1
300 TABLET ORAL DAILY
COMMUNITY
Start: 2025-04-24

## 2025-05-21 RX ORDER — SODIUM BICARBONATE 650 MG/1
650 TABLET ORAL 4 TIMES DAILY
COMMUNITY

## 2025-05-21 RX ORDER — GABAPENTIN 300 MG/1
300 CAPSULE ORAL 3 TIMES DAILY
COMMUNITY

## 2025-05-21 RX ORDER — APIXABAN 5 MG/1
5 TABLET, FILM COATED ORAL 2 TIMES DAILY
COMMUNITY
Start: 2024-12-19

## 2025-05-21 NOTE — PROGRESS NOTES
MEDICAL ONCOLOGY INITIAL CONSULTATION NOTE    Patient ID: Marci Craft is a 59 y.o. female.    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  CT abd/pel showed a mass involving the distal body and tail of the pancreas with soft tissue stranding extending into the peripancreatic tissue which may represent superimposed pancreatitis.  EUS was performed, a parenchymal mass was noted in the pancreas; biopsy was not performed.  MRI abdomen in April 2023 showed a 6.7 x 3.8 x 3.8 cm heterogeneously enhancing mass within the pancreas, slightly decreased from previous imaging.  EUS performed in June 2023, parenchymal mass again noted in the pancreas with ductal dilation in the pancreas.  Biopsy of the mass revealed well-differentiated neuroendocrine tumor, grade 2 of 3.  The patient denies abdominal pain, nausea, vomiting, anorexia, weight loss, diarrhea or flushing.     In July 2023 she underwent laparoscopic/robotic distal pancreatectomy and splenectomy, final pathology revealed well-differentiated neuroendocrine tumor of the tail of the pancreas, 8.5 cm, grade 1, surgical margins clear, 1/30 regional nodes positive for metastatic disease.  She has been on surveillance since then.  Presents to our clinic today for further evaluation.  Voices no acute complaints.       Past Medical History:   Diagnosis Date    Abdominal pain     Anxiety     Arthritis     CHF (congestive heart failure)     Depression     Diarrhea     Gout     Hypertension     Indigestion     Nausea & vomiting     Obesity     Pacemaker 10/15/2018    Paroxysmal A-fib     Peptic ulcer disease     Primary pancreatic neuroendocrine tumor     SOB (shortness of breath) on exertion      Family History   Problem Relation Name Age of Onset    Depression Mother      Hyperlipidemia  Maternal Aunt      Hypertension Maternal Aunt      Breast cancer Maternal Aunt       Social History[1]  Past Surgical History:   Procedure Laterality Date    CARDIAC SURGERY      CARDIAC VALVE REPLACEMENT       SECTION      CHOLECYSTECTOMY      COLONOSCOPY W/ POLYPECTOMY      EYE SURGERY      HIP ARTHROPLASTY Right 2020    Procedure: ARTHROPLASTY, HIP;  Surgeon: Minor Robledo MD;  Location: \[facility]\"" MAIN OR;  Service: Orthopedics;  Laterality: Right;    INJECTION OF JOINT Right 2020    Procedure: Injection, Joint, Shoulder, Hip, Or Knee;  Surgeon: Minor Robledo MD;  Location: \[facility]\"" MAIN OR;  Service: Orthopedics;  Laterality: Right;    INSERTION OF PACEMAKER  10/09/2018    defibrillator    IRRIGATION AND DEBRIDEMENT OF LOWER EXTREMITY Right 10/27/2020    Procedure: IRRIGATION AND DEBRIDEMENT RIGHT HIP;  Surgeon: Minor Robledo MD;  Location: \[facility]\"" MAIN OR;  Service: Orthopedics;  Laterality: Right;    MITRAL VALVE REPLACEMENT  2017    REVISION TOTAL HIP ARTHROPLASTY Left     ROBOT-ASSISTED PANCREATECTOMY N/A 2023    Procedure: ROBOTIC PANCREATECTOMY;  Surgeon: Ab Das MD;  Location: [facility] OR;  Service: General;  Laterality: N/A;  DISTAL PANC; ULTRASOUND GUIDANCE // AND ROBOT SPLENECTOMY    TUBAL LIGATION           Review of systems:  Review of Systems   Constitutional:  Negative for activity change, appetite change, chills, diaphoresis, fatigue and unexpected weight change.   HENT:  Negative for congestion, facial swelling, hearing loss, mouth sores, trouble swallowing and voice change.    Eyes:  Negative for photophobia, pain, discharge and itching.   Respiratory:  Negative for apnea, cough, choking, chest tightness and shortness of breath.    Cardiovascular:  Negative for chest pain, palpitations and leg swelling.   Gastrointestinal:  Negative for abdominal distention, abdominal pain, anal bleeding and blood in stool.   Endocrine: Negative for cold intolerance, heat  intolerance, polydipsia and polyphagia.   Genitourinary:  Negative for difficulty urinating, dysuria, flank pain and hematuria.   Musculoskeletal:  Negative for arthralgias, back pain, joint swelling, myalgias, neck pain and neck stiffness.   Skin:  Negative for color change, pallor and wound.   Allergic/Immunologic: Negative for environmental allergies, food allergies and immunocompromised state.   Neurological:  Negative for dizziness, seizures, facial asymmetry, speech difficulty, light-headedness, numbness and headaches.   Hematological:  Negative for adenopathy. Does not bruise/bleed easily.   Psychiatric/Behavioral:  Negative for agitation, behavioral problems, confusion, decreased concentration and sleep disturbance.                                  Physical Exam  Vitals and nursing note reviewed.   Constitutional:       General: She is not in acute distress.     Appearance: Normal appearance. She is not ill-appearing.   HENT:      Head: Normocephalic and atraumatic.      Nose: No congestion or rhinorrhea.   Eyes:      General: No scleral icterus.     Extraocular Movements: Extraocular movements intact.      Pupils: Pupils are equal, round, and reactive to light.   Cardiovascular:      Rate and Rhythm: Normal rate and regular rhythm.      Pulses: Normal pulses.      Heart sounds: Normal heart sounds. No murmur heard.     No gallop.   Pulmonary:      Effort: Pulmonary effort is normal. No respiratory distress.      Breath sounds: Normal breath sounds. No stridor. No wheezing or rhonchi.   Abdominal:      General: Bowel sounds are normal. There is no distension.      Palpations: There is no mass.      Tenderness: There is no abdominal tenderness. There is no guarding.   Musculoskeletal:         General: No swelling, tenderness, deformity or signs of injury. Normal range of motion.      Cervical back: Normal range of motion and neck supple. No rigidity. No muscular tenderness.      Right lower leg: No edema.       Left lower leg: No edema.   Skin:     General: Skin is warm.      Coloration: Skin is not jaundiced or pale.      Findings: No bruising or lesion.   Neurological:      General: No focal deficit present.      Mental Status: She is alert and oriented to person, place, and time.      Cranial Nerves: No cranial nerve deficit.      Sensory: No sensory deficit.      Motor: No weakness.      Gait: Gait normal.   Psychiatric:         Mood and Affect: Mood normal.         Behavior: Behavior normal.         Thought Content: Thought content normal.       Vitals:    05/21/25 1006   BP: 90/63   Pulse: 87   Resp: 16   Body surface area is 1.91 meters squared.    Assessment/Plan:      ECOG 1    Well-differentiated neuroendocrine tumor arising from the tail of the pancreas:      == Grade 1.  Status post laparoscopic/robotic distal pancreatectomy/splenectomy in July of 2023 no evidence of disease on prior surveillance scans.  Last CT scan for restaging tension in April of 2025 showed no evidence of recurrence. Discussed with patient surveillance guidelines with need to repeat imaging in 6 months.  Continue observation.      2. Anemia:    == reports being up-to-date on C-scopy  == Will obtain CBC with diff CMP and iron profile along with vitamin B12 and folate for further anemia         Plan:  Obtain CBC with diff, B12, folate and iron profile for further evaluation of anemia  Restaging CT scan chest abdomen pelvis from last in 6 months: Oct 2025    Return to clinic in 6 weeks for MD visit with labs:  CBC, CMP for follow-up      Future Appointments   Date Time Provider Department Center   6/18/2025  8:40 AM Sunny Centeno MD [facility]         Total time spent in counseling and discussion about further management options including relevant lab work, treatment,  prognosis, medications and intended side effects was more than 60 minutes. More than 50% of the time was spent on counseling and coordination of care.  I spent  a total of 60 minutes on the day of the visit.This includes face to face time and non-face to face time preparing to see the patient (eg, review of tests), Obtaining and/or reviewing separately obtained history, Documenting clinical information in the electronic or other health record, Independently interpreting resultsand communicating results to the patient/family/caregiver, or Care coordination.            [1]   Social History  Socioeconomic History    Marital status:    Tobacco Use    Smoking status: Never    Smokeless tobacco: Never   Substance and Sexual Activity    Alcohol use: Yes     Alcohol/week: 1.0 standard drink of alcohol     Types: 1 Glasses of wine per week     Comment: occassions    Drug use: Never    Sexual activity: Not Currently     Partners: Male     Social Drivers of Health     Food Insecurity: No Food Insecurity (4/18/2025)    Received from [facility] and Its Subsidiaries and Affiliates    Hunger Vital Sign     Worried About Running Out of Food in the Last Year: Never true     Ran Out of Food in the Last Year: Never true   Transportation Needs: No Transportation Needs (4/18/2025)    Received from [facility] and Its Subsidiaries and Affiliates    PRAPARE - Transportation     Lack of Transportation (Medical): No     Lack of Transportation (Non-Medical): No   Housing Stability: Low Risk  (4/18/2025)    Received from [facility] and Its Subsidiaries and Affiliates    Housing Stability Vital Sign     Unable to Pay for Housing in the Last Year: No     Number of Times Moved in the Last Year: 0     Homeless in the Last Year: No

## 2025-05-22 DIAGNOSIS — D63.8 ANEMIA IN OTHER CHRONIC DISEASES CLASSIFIED ELSEWHERE: Primary | ICD-10-CM

## 2025-06-18 ENCOUNTER — OFFICE VISIT (OUTPATIENT)
Facility: CLINIC | Age: 60
End: 2025-06-18
Payer: COMMERCIAL

## 2025-06-18 VITALS
HEART RATE: 81 BPM | HEIGHT: 68 IN | DIASTOLIC BLOOD PRESSURE: 71 MMHG | RESPIRATION RATE: 16 BRPM | OXYGEN SATURATION: 100 % | SYSTOLIC BLOOD PRESSURE: 102 MMHG | TEMPERATURE: 98 F | BODY MASS INDEX: 25.31 KG/M2 | WEIGHT: 167 LBS

## 2025-06-18 DIAGNOSIS — D75.839 THROMBOCYTOSIS: Primary | ICD-10-CM

## 2025-06-18 PROCEDURE — 1159F MED LIST DOCD IN RCRD: CPT | Mod: CPTII,S$GLB,, | Performed by: INTERNAL MEDICINE

## 2025-06-18 PROCEDURE — G2211 COMPLEX E/M VISIT ADD ON: HCPCS | Mod: S$GLB,,, | Performed by: INTERNAL MEDICINE

## 2025-06-18 PROCEDURE — 3078F DIAST BP <80 MM HG: CPT | Mod: CPTII,S$GLB,, | Performed by: INTERNAL MEDICINE

## 2025-06-18 PROCEDURE — 3008F BODY MASS INDEX DOCD: CPT | Mod: CPTII,S$GLB,, | Performed by: INTERNAL MEDICINE

## 2025-06-18 PROCEDURE — 3074F SYST BP LT 130 MM HG: CPT | Mod: CPTII,S$GLB,, | Performed by: INTERNAL MEDICINE

## 2025-06-18 PROCEDURE — 1160F RVW MEDS BY RX/DR IN RCRD: CPT | Mod: CPTII,S$GLB,, | Performed by: INTERNAL MEDICINE

## 2025-06-18 PROCEDURE — 99215 OFFICE O/P EST HI 40 MIN: CPT | Mod: S$GLB,,, | Performed by: INTERNAL MEDICINE

## 2025-06-18 NOTE — PROGRESS NOTES
MEDICAL ONCOLOGY FOLLOW UP CONSULTATION NOTE    Patient ID: Marci Craft is a 59 y.o. female.    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  CT abd/pel showed a mass involving the distal body and tail of the pancreas with soft tissue stranding extending into the peripancreatic tissue which may represent superimposed pancreatitis.  EUS was performed, a parenchymal mass was noted in the pancreas; biopsy was not performed.  MRI abdomen in April 2023 showed a 6.7 x 3.8 x 3.8 cm heterogeneously enhancing mass within the pancreas, slightly decreased from previous imaging.  EUS performed in June 2023, parenchymal mass again noted in the pancreas with ductal dilation in the pancreas.  Biopsy of the mass revealed well-differentiated neuroendocrine tumor, grade 2 of 3.  The patient denies abdominal pain, nausea, vomiting, anorexia, weight loss, diarrhea or flushing.     In July 2023 she underwent laparoscopic/robotic distal pancreatectomy and splenectomy, final pathology revealed well-differentiated neuroendocrine tumor of the tail of the pancreas, 8.5 cm, grade 1, surgical margins clear, 1/30 regional nodes positive for metastatic disease.  She has been on surveillance since then.  Presents to our clinic today for further evaluation.  Voices no acute complaints.       Past Medical History:   Diagnosis Date    Abdominal pain     Anxiety     Arthritis     CHF (congestive heart failure)     Depression     Diarrhea     Gout     Hypertension     Indigestion     Nausea & vomiting     Obesity     Pacemaker 10/15/2018    Paroxysmal A-fib     Peptic ulcer disease     Primary pancreatic neuroendocrine tumor     SOB (shortness of breath) on exertion      Family History   Problem Relation Name Age of Onset    Depression Mother      Hyperlipidemia  Maternal Aunt      Hypertension Maternal Aunt      Breast cancer Maternal Aunt       Social History[1]  Past Surgical History:   Procedure Laterality Date    CARDIAC SURGERY      CARDIAC VALVE REPLACEMENT       SECTION      CHOLECYSTECTOMY      COLONOSCOPY W/ POLYPECTOMY      EYE SURGERY      HIP ARTHROPLASTY Right 2020    Procedure: ARTHROPLASTY, HIP;  Surgeon: Minor Robledo MD;  Location: [facility] MAIN OR;  Service: Orthopedics;  Laterality: Right;    INJECTION OF JOINT Right 2020    Procedure: Injection, Joint, Shoulder, Hip, Or Knee;  Surgeon: Minor Robledo MD;  Location: [facility] MAIN OR;  Service: Orthopedics;  Laterality: Right;    INSERTION OF PACEMAKER  10/09/2018    defibrillator    IRRIGATION AND DEBRIDEMENT OF LOWER EXTREMITY Right 10/27/2020    Procedure: IRRIGATION AND DEBRIDEMENT RIGHT HIP;  Surgeon: Minor Robledo MD;  Location: [facility] MAIN OR;  Service: Orthopedics;  Laterality: Right;    MITRAL VALVE REPLACEMENT  2017    REVISION TOTAL HIP ARTHROPLASTY Left     ROBOT-ASSISTED PANCREATECTOMY N/A 2023    Procedure: ROBOTIC PANCREATECTOMY;  Surgeon: Ab Das MD;  Location: [facility] OR;  Service: General;  Laterality: N/A;  DISTAL PANC; ULTRASOUND GUIDANCE // AND ROBOT SPLENECTOMY    TUBAL LIGATION           Review of systems:  Review of Systems   Constitutional:  Negative for activity change, appetite change, chills, diaphoresis, fatigue and unexpected weight change.   HENT:  Negative for congestion, facial swelling, hearing loss, mouth sores, trouble swallowing and voice change.    Eyes:  Negative for photophobia, pain, discharge and itching.   Respiratory:  Negative for apnea, cough, choking, chest tightness and shortness of breath.    Cardiovascular:  Negative for chest pain, palpitations and leg swelling.   Gastrointestinal:  Negative for abdominal distention, abdominal pain, anal bleeding and blood in stool.   Endocrine: Negative for cold intolerance, heat  intolerance, polydipsia and polyphagia.   Genitourinary:  Negative for difficulty urinating, dysuria, flank pain and hematuria.   Musculoskeletal:  Negative for arthralgias, back pain, joint swelling, myalgias, neck pain and neck stiffness.   Skin:  Negative for color change, pallor and wound.   Allergic/Immunologic: Negative for environmental allergies, food allergies and immunocompromised state.   Neurological:  Negative for dizziness, seizures, facial asymmetry, speech difficulty, light-headedness, numbness and headaches.   Hematological:  Negative for adenopathy. Does not bruise/bleed easily.   Psychiatric/Behavioral:  Negative for agitation, behavioral problems, confusion, decreased concentration and sleep disturbance.                                  Physical Exam  Vitals and nursing note reviewed.   Constitutional:       General: She is not in acute distress.     Appearance: Normal appearance. She is not ill-appearing.   HENT:      Head: Normocephalic and atraumatic.      Nose: No congestion or rhinorrhea.   Eyes:      General: No scleral icterus.     Extraocular Movements: Extraocular movements intact.      Pupils: Pupils are equal, round, and reactive to light.   Cardiovascular:      Rate and Rhythm: Normal rate and regular rhythm.      Pulses: Normal pulses.      Heart sounds: Normal heart sounds. No murmur heard.     No gallop.   Pulmonary:      Effort: Pulmonary effort is normal. No respiratory distress.      Breath sounds: Normal breath sounds. No stridor. No wheezing or rhonchi.   Abdominal:      General: Bowel sounds are normal. There is no distension.      Palpations: There is no mass.      Tenderness: There is no abdominal tenderness. There is no guarding.   Musculoskeletal:         General: No swelling, tenderness, deformity or signs of injury. Normal range of motion.      Cervical back: Normal range of motion and neck supple. No rigidity. No muscular tenderness.      Right lower leg: No edema.       Left lower leg: No edema.   Skin:     General: Skin is warm.      Coloration: Skin is not jaundiced or pale.      Findings: No bruising or lesion.   Neurological:      General: No focal deficit present.      Mental Status: She is alert and oriented to person, place, and time.      Cranial Nerves: No cranial nerve deficit.      Sensory: No sensory deficit.      Motor: No weakness.      Gait: Gait normal.   Psychiatric:         Mood and Affect: Mood normal.         Behavior: Behavior normal.         Thought Content: Thought content normal.       Vitals:    06/18/25 0842   BP: 102/71   Pulse: 81   Resp: 16   Temp: 98.2 °F (36.8 °C)   Body surface area is 1.91 meters squared.    Assessment/Plan:      ECOG 1    Well-differentiated neuroendocrine tumor arising from the tail of the pancreas:      == Grade 1.  Status post laparoscopic/robotic distal pancreatectomy/splenectomy in July of 2023 no evidence of disease on prior surveillance scans.  Last CT scan for restaging tension in April of 2025 showed no evidence of recurrence. Discussed with patient surveillance guidelines with need to repeat imaging in 6 months. To be scheduled Oct 2025.  Continue observation.      2. Anemia/Thrombocytosis/ Leucocytosis:    == reports being up-to-date on C-scopy  == Normal B12/ folate and iron profile. Will obtain Peripheral smear and flow cytometry along with checking for BCR-ABL gene rearrangement and JAK2 V6 1 7 with reflex mutations to rule out myeloproliferative disorder.         Plan:  Obtain peripheral smear and flow cytometry along with checking for BCR-ABL gene rearrangement and JAK2 V6 1 7 and reflex mutations  Restaging CT scan chest abdomen pelvis due Oct 2025    Return to clinic in 4 weeks for MD visit with labs:  CBC, CMP for follow-up        Total time spent in counseling and discussion about further management options including relevant lab work, treatment,  prognosis, medications and intended side effects was more than  60 minutes. More than 50% of the time was spent on counseling and coordination of care.  I spent a total of 60 minutes on the day of the visit.This includes face to face time and non-face to face time preparing to see the patient (eg, review of tests), Obtaining and/or reviewing separately obtained history, Documenting clinical information in the electronic or other health record, Independently interpreting resultsand communicating results to the patient/family/caregiver, or Care coordination.              [1]   Social History  Socioeconomic History    Marital status:    Tobacco Use    Smoking status: Former     Types: Cigarettes    Smokeless tobacco: Never   Substance and Sexual Activity    Alcohol use: Yes     Alcohol/week: 1.0 standard drink of alcohol     Types: 1 Glasses of wine per week     Comment: occassions    Drug use: Never    Sexual activity: Not Currently     Partners: Male     Social Drivers of Health     Food Insecurity: No Food Insecurity (4/18/2025)    Received from [facility] and Its Subsidiaries and Affiliates    Hunger Vital Sign     Worried About Running Out of Food in the Last Year: Never true     Ran Out of Food in the Last Year: Never true   Transportation Needs: No Transportation Needs (4/18/2025)    Received from [facility] and [facility] Subsidiaries and Affiliates    PRAPARE - Transportation     Lack of Transportation (Medical): No     Lack of Transportation (Non-Medical): No   Housing Stability: Low Risk  (4/18/2025)    [facility]    Housing Stability Vital Sign     Unable to Pay for Housing in the Last Year: No     Number of Times Moved in the Last Year: 0     Homeless in the Last Year: No

## 2025-07-18 DIAGNOSIS — C7B.8 OTHER SECONDARY NEUROENDOCRINE TUMORS: ICD-10-CM

## 2025-07-18 DIAGNOSIS — D53.9 NUTRITIONAL ANEMIA, UNSPECIFIED: ICD-10-CM

## 2025-07-18 DIAGNOSIS — D75.839 THROMBOCYTOSIS: Primary | ICD-10-CM

## 2025-07-18 DIAGNOSIS — D63.8 ANEMIA IN OTHER CHRONIC DISEASES CLASSIFIED ELSEWHERE: ICD-10-CM

## 2025-07-23 ENCOUNTER — TELEPHONE (OUTPATIENT)
Dept: HEMATOLOGY/ONCOLOGY | Facility: CLINIC | Age: 60
End: 2025-07-23
Payer: COMMERCIAL

## (undated) DEVICE — CANNULA REDUCER 12-8MM

## (undated) DEVICE — GLOVE PROTEXIS HYDROGEL SZ6.5

## (undated) DEVICE — DRAPE COLUMN DAVINCI XI

## (undated) DEVICE — HOLDER STRIP-T SELF ADH 2X10IN

## (undated) DEVICE — RELOAD SUREFORM 60 4.3 GRN 6R

## (undated) DEVICE — SYR ONLY LUER LOCK 20CC

## (undated) DEVICE — SEALER VESSEL EXTEND

## (undated) DEVICE — PAD PINK TRENDELENBURG POS XL

## (undated) DEVICE — SEALANT VISTASEAL FIBRIN 10ML

## (undated) DEVICE — PENCIL ELECSURG ROCKER 15FT

## (undated) DEVICE — SUT PDS PLUS MONO 1 CT 36IN

## (undated) DEVICE — KIT GEN LAPAROSCOPY LAFAYETTE

## (undated) DEVICE — COVER MAYO STAND REINFRCD 30

## (undated) DEVICE — RELOAD ECHELON FLEX BLU 60MM

## (undated) DEVICE — LEGGING SURG CONV 43X28IN

## (undated) DEVICE — TROCAR KII FIOS ZTHREAD 11X100

## (undated) DEVICE — SPONGE LAP 4X18 PREWASHED

## (undated) DEVICE — SOL CLEARIFY VISUALIZATION LAP

## (undated) DEVICE — PATCH SEALANT EVARREST 2X4

## (undated) DEVICE — SUT SILK 2.0 BLK 18

## (undated) DEVICE — CANNULA SEAL 12MM

## (undated) DEVICE — STAPLER ECHELON FLEX GST 60MM

## (undated) DEVICE — COVER TIP CURVED SCISSORS XI

## (undated) DEVICE — SUT 2/0 30IN SILK BLK BRAI

## (undated) DEVICE — COVER PROBE US 5.5X58L NON LTX

## (undated) DEVICE — SUT VICRYL+ 27 UR-6 VIOL

## (undated) DEVICE — SOL NACL IRR 1000ML BTL

## (undated) DEVICE — NDL 20GX1-1/2IN IB

## (undated) DEVICE — HEMOSTAT SURGICEL 2X14IN

## (undated) DEVICE — IRRIGATOR SUCTION W/TIP

## (undated) DEVICE — STAPLER ENDOWRIST 30 WHITE

## (undated) DEVICE — SEAL UNIVERSAL 5MM-8MM XI

## (undated) DEVICE — DRAIN JACKSON PRATT TRCR 19FR

## (undated) DEVICE — SUT MCRYL PLUS 4-0 PS2 27IN

## (undated) DEVICE — ELECTRODE REM PLYHSV RETURN 9

## (undated) DEVICE — BANDAGE CURITY SHEER ADH 1X3IN

## (undated) DEVICE — SET TRI-LUMEN FILTERED TUBE

## (undated) DEVICE — GLOVE PROTEXIS HYDROGEL SZ7

## (undated) DEVICE — CHLORAPREP W TINT 26ML APPL

## (undated) DEVICE — PORT AIRSEAL 12/120MM LPI

## (undated) DEVICE — DRAPE ARM DAVINCI XI

## (undated) DEVICE — KIT SURGICAL TURNOVER

## (undated) DEVICE — GLOVE PROTEXIS BLUE LATEX 7

## (undated) DEVICE — BANDAID GARFIELD

## (undated) DEVICE — STAPLER SUREFORM 60 SPU

## (undated) DEVICE — CONTAINER SPECIMEN SCREW 4OZ

## (undated) DEVICE — OBTURATOR BLADELESS 8MM XI CLR

## (undated) DEVICE — RESERVOIR JACKSON-PRATT 100CC

## (undated) DEVICE — SOL IRRI STRL WATER 1000ML

## (undated) DEVICE — TRAY CATH FOL SIL URIMTR 16FR

## (undated) DEVICE — ADHESIVE DERMABOND ADVANCED

## (undated) DEVICE — BAG INZII TISS RETRV 12/15MM

## (undated) DEVICE — SHEATH ENDOWRIST 45MM

## (undated) DEVICE — TROCAR ENDO Z THREAD KII 5X100